# Patient Record
Sex: FEMALE | Race: WHITE
[De-identification: names, ages, dates, MRNs, and addresses within clinical notes are randomized per-mention and may not be internally consistent; named-entity substitution may affect disease eponyms.]

---

## 2017-06-27 ENCOUNTER — HOSPITAL ENCOUNTER (EMERGENCY)
Dept: HOSPITAL 25 - UCEAST | Age: 57
Discharge: HOME | End: 2017-06-27
Payer: COMMERCIAL

## 2017-06-27 VITALS — SYSTOLIC BLOOD PRESSURE: 132 MMHG | DIASTOLIC BLOOD PRESSURE: 83 MMHG
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
## 2017-06-27 DIAGNOSIS — S60.562A: Primary | ICD-10-CM
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
## 2017-06-27 PROCEDURE — 99212 OFFICE O/P EST SF 10 MIN: CPT

## 2017-06-27 PROCEDURE — G0463 HOSPITAL OUTPT CLINIC VISIT: HCPCS

## 2017-06-27 NOTE — UC
Skin Complaint HPI





- HPI Summary


HPI Summary: 





The patient comes in today for:





1.   Skin condition:





Onset:  2-3 hours. 


Palliative/provocative:  Pressing or flexing to a fish makes it worse.  


Quality: Throbbing, tightness.


Region:  Dorsum of the left hand. 


Severity:  4/10


Time: Constant.


Associated symptoms:


   Event:  She was emptying a  bucket and while doing so noticed a 

spider on her hand.  She felt a bite and she shook her hand and the spider flew 

off.  


   Headache:


   Left hand: throbbing, tightness, 


   She also complains of feeling dizziness (unbalanced).


   Last Tetanus was done in the last 10 years. 





*





- History of Current Complaint


Chief Complaint: UCSkin


Time Seen by Provider: 06/27/17 17:49


Stated Complaint: SPIDER BITE,DIZZY, SWELLING AT THE SITE


Hx Obtained From: Patient, Family/Caretaker


Hx Last Menstrual Period: 30 years ago.





- Allergy/Home Medications


Allergies/Adverse Reactions: 


 Allergies











Allergy/AdvReac Type Severity Reaction Status Date / Time


 


Sulfa Drugs Allergy Severe Hives Verified 05/19/17 14:19


 


artifical chemical Allergy Unknown Hives Uncoded 05/19/17 14:19











Home Medications: 


 Home Medications





Aspirin [Aspirin 81 MG TAB] 81 mg PO DAILY 06/27/17 [History Confirmed 06/27/17]


Indomethacin CAP* [Indocin CAP*] 25 mg PO BID 06/27/17 [History Confirmed 06/27/ 17]


Montelukast Sodium TAB* [Singulair 5 mg TAB*] 5 mg PO DAILY 06/27/17 [History 

Confirmed 06/27/17]











Review of Systems


Constitutional: Negative


Skin: Rash


Eyes: Negative


ENT: Negative


Respiratory: Negative


Cardiovascular: Negative


Gastrointestinal: Negative


Genitourinary: Negative


All Other Systems Reviewed And Are Negative: Yes





PMH/Surg Hx/FS Hx/Imm Hx


Previously Healthy: No - Tremor of head, Rheumatoid arthritis, 


Cardiovascular History: Hypertension


Respiratory History: Asthma


Psychological History: Anxiety





- Surgical History


Surgical History: Yes


Surgery Procedure, Year, and Place: TOTAL RIGHT KNEE RECONSTRUCTION, ACL REPAIR

, TWO RIGHT KNEE SCOPINGS,TOTAL HYSTERECTOMY,RECTAL REPAIR,LASER EYE SURGERY.  

LAPROSCOPIC ABLASION, LYMPH GLAND BIOPSY.  ACL REPAIR RIGHT-SCAR TISSUE REMOVED 

FROM RIGTH ARM





- Family History


Known Family History: Positive: Cardiac Disease, Hypertension, Diabetes





- Social History


Occupation: Unemployed


Lives: With Family


Alcohol Use: Rare


Substance Use Type: None


Smoking Status (MU): Never Smoked Tobacco





- Immunization History


Most Recent Tetanus Shot: Within past 10 yrs.





Physical Exam


Triage Information Reviewed: Yes


Appearance: Well-Appearing, No Pain Distress, Well-Nourished


Vital Signs: 


 Initial Vital Signs











Temp  98.0 F   06/27/17 16:54


 


Pulse  79   06/27/17 16:54


 


Resp  18   06/27/17 16:54


 


BP  132/83   06/27/17 16:54


 


Pulse Ox  100   06/27/17 16:54











Vital Signs Reviewed: Yes


Eyes: Positive: Conjunctiva Clear.  Negative: Discharge


ENT: Positive: Hearing grossly normal.  Negative: Pharyngeal erythema, Nasal 

congestion, Nasal drainage, TM bulging, TM dull, TM red, Tonsillar swelling, 

Tonsillar exudate


Dental: Negative: Gross Decay/Caries @, Dental Fracture @


Neck: Positive: Supple, Nontender, No Lymphadenopathy.  Negative: Nuchal 

Rigidity


Respiratory: Positive: Lungs clear, No respiratory distress, No accessory 

muscle use.  Negative: Crackles, Wheezing


Cardiovascular: Positive: RRR, No Murmur


Abdomen Description: Positive: Nontender, No Organomegaly, Soft.  Negative: 

Distended, Guarding, Peritoneal Signs


Musculoskeletal: Positive: Strength Intact, ROM Intact, Edema @, Other: - Left 

hand:  There was an ecchymotic area of the dorsum of the left hand with central 

induration.  There was tenderness present.  There was mild edema.  He had full 

range of motion of her left hand/fingers.


Neurological: Positive: Alert, Muscle Tone Normal


Psychological: Positive: Age Appropriate Behavior, Consolable


Skin: Negative: rashes, breakdown





Course/Dx





- Course


Course Of Treatment: Patient told about spider bite care and also her  

was told of this.





- Differential Diagnoses - Skin Complaint


Differential Diagnoses: Impetigo, Tinea





- Diagnoses


Provider Diagnoses: Insect bite.





Discharge





- Discharge Plan


Condition: Stable


Disposition: HOME


Patient Education Materials:  Insect Bite or Sting (ED)


Referrals: 


Mali Antonio MD [Primary Care Provider] - 2 Days (Please see your primary 

care provider in a couple days to see how well you are doing.  If you get worse

, please be seen sooner. )


Additional Instructions: 


Please see your primary care provider in 2-3 days to see how well you are 

doing.  If you get worse (increasing redness, soreness, swelling or discharge) 

please be seen sooner.  Elevated and apply cold compresses (20 minutes on, and 

20 minutes off) several times a day for the next 48 hours.).

## 2018-03-02 NOTE — HP
HISTORY AND PHYSICAL:

 

DATE OF OFFICE VISIT:  03/02/18

 

DATE OF SURGERY:  03/15/18

 

SURGEON:  Laure Jeter MD* (dictated by ANDRE Xiong).

 

PROCEDURE:  Right total knee arthroplasty.

 

CHIEF COMPLAINT:  Right knee pain.

 

HISTORY OF PRESENT ILLNESS:  Ms. Fairchild is a 57-year-old female with complaints 
of right knee pain.  She has failed conservative management and elected to 
proceed with a right total knee arthroplasty, which is scheduled for 03/15/18 
with Dr. Jeter.

 

PAST MEDICAL HISTORY:  Hypertension, anxiety, cervical dystonia, chronic pain 
syndrome, ankylosing spondylitis, hyperlipidemia and asthma.

 

PAST SURGICAL HISTORY:  Tubal ligation, hysterectomy, vein stripping, right 
knee arthroscopy x5, axillary lymphadenectomy, laser eye surgery and roddy-
neuropathy.

 

CURRENT MEDICATIONS:

1.  Montelukast sodium 10 mg daily.

2.  EpiPen as needed.

3.  _____ mg twice daily.

4.  Trazodone 50 mg q.h.s.

5.  Propranolol 20 mg twice a day.

6.  Clonazepam 0.5 mg as needed.

7.  Dymista, allergy.

8.  Allegra, allergy.

9.  Dulera.

10.  Pazeo.

11.  Ventolin HFA.

12.  Oxybutynin chloride 5 mg daily.

13.  Hair Skin and Nails every day.

14.  Calcium with vitamin D daily.

15.  Vitamin B and fish oil.

 

ALLERGIES:  BIAXIN, _____ environmental and CODEINE causing nausea.

 

FAMILY HISTORY:  Family history of diabetes, aortic aneurysm, coronary artery 
disease, stroke, Castleman's disease and schizophrenia.

 

SOCIAL HISTORY:  She is a 57-year-old female.  She lives with her .  She 
is a retired teacher at [employer].  She does not smoke, use drugs 
or alcohol.

 

REVIEW OF SYSTEMS:  A complete 14 point review of systems was reviewed with the 
patient, is positive for asthma.  She denies history of DVT, PE, hepatitis C or 
HIV.

 

PHYSICAL EXAM: GENERAL:  She is well developed, well nourished, in no acute 
distress.

 

VITAL SIGNS:  She stands 5 feet 3 inches tall, weighs 135 pounds, her blood 
pressure is 126/86, heart rate is 80.

 

HEENT:  She is normocephalic, atraumatic.

 

NECK:  Supple.  No palpable lymph nodes.

 

PULMONARY:  Lungs are clear to auscultation bilaterally.

 

CARDIO:  Regular rate and rhythm.  Strong S1 and S2.

 

ABDOMEN:  Soft, nontender, nondistended.

 

NEUROLOGIC:  She is alert and oriented x3.  Cranial nerves II through XII are 
intact.

 

MUSCULOSKELETAL:  Right lower extremity, skin is intact.  There are no open 
wounds or abrasions.  She has some tenderness over the medial and lateral joint 
line.  5 to 130 degrees of motion with patellofemoral crepitus.  There is a 
moderate effusion.  2+ dorsalis pedis pulses.  She has intact sensation in her 
lower extremities.  Muscle group strengths are intact at 5/5.

 

ASSESSMENT AND PLAN:  Ms. Fairchild is a 57-year-old female with end-stage 
osteoarthritis of right knee.  She has failed conservative management and 
elected to proceed with a right total knee arthroplasty, which is scheduled for 
03/15/18 with Dr. Jeter.  Dr. Jeter discussed the risks and benefits of the 
surgery on today's visit.  All of her questions are answered.  She will follow 
with Dr. Jeter in 2 weeks after the surgery.

 

 ____________________________________ 

ANDRE IXONG

 

[id number]/CPS #: [id number]

DANE

## 2018-03-15 ENCOUNTER — HOSPITAL ENCOUNTER (INPATIENT)
Dept: HOSPITAL 25 - AA | Age: 58
LOS: 4 days | Discharge: HOME HEALTH | DRG: 302 | End: 2018-03-19
Attending: ORTHOPAEDIC SURGERY | Admitting: ORTHOPAEDIC SURGERY
Payer: COMMERCIAL

## 2018-03-15 DIAGNOSIS — Z82.3: ICD-10-CM

## 2018-03-15 DIAGNOSIS — M45.9: ICD-10-CM

## 2018-03-15 DIAGNOSIS — Z81.8: ICD-10-CM

## 2018-03-15 DIAGNOSIS — J45.909: ICD-10-CM

## 2018-03-15 DIAGNOSIS — Z79.82: ICD-10-CM

## 2018-03-15 DIAGNOSIS — Z88.5: ICD-10-CM

## 2018-03-15 DIAGNOSIS — Z98.51: ICD-10-CM

## 2018-03-15 DIAGNOSIS — Z88.8: ICD-10-CM

## 2018-03-15 DIAGNOSIS — I95.1: ICD-10-CM

## 2018-03-15 DIAGNOSIS — R53.1: ICD-10-CM

## 2018-03-15 DIAGNOSIS — Z82.49: ICD-10-CM

## 2018-03-15 DIAGNOSIS — M21.061: ICD-10-CM
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
## 2018-03-15 DIAGNOSIS — Z79.01: ICD-10-CM

## 2018-03-15 DIAGNOSIS — I10: ICD-10-CM

## 2018-03-15 DIAGNOSIS — D62: ICD-10-CM

## 2018-03-15 DIAGNOSIS — R11.0: ICD-10-CM

## 2018-03-15 DIAGNOSIS — M25.761: ICD-10-CM

## 2018-03-15 DIAGNOSIS — Z90.710: ICD-10-CM

## 2018-03-15 DIAGNOSIS — F41.9: ICD-10-CM

## 2018-03-15 DIAGNOSIS — M25.461: ICD-10-CM

## 2018-03-15 DIAGNOSIS — T14.90XS: ICD-10-CM

## 2018-03-15 DIAGNOSIS — R09.89: ICD-10-CM

## 2018-03-15 DIAGNOSIS — R42: ICD-10-CM

## 2018-03-15 DIAGNOSIS — Z83.3: ICD-10-CM

## 2018-03-15 DIAGNOSIS — R13.10: ICD-10-CM

## 2018-03-15 DIAGNOSIS — M17.31: Primary | ICD-10-CM

## 2018-03-15 DIAGNOSIS — E78.5: ICD-10-CM

## 2018-03-15 DIAGNOSIS — G89.4: ICD-10-CM

## 2018-03-15 DIAGNOSIS — R19.7: ICD-10-CM

## 2018-03-15 PROCEDURE — 87070 CULTURE OTHR SPECIMN AEROBIC: CPT

## 2018-03-15 PROCEDURE — 87899 AGENT NOS ASSAY W/OPTIC: CPT

## 2018-03-15 PROCEDURE — 85049 AUTOMATED PLATELET COUNT: CPT

## 2018-03-15 PROCEDURE — 85018 HEMOGLOBIN: CPT

## 2018-03-15 PROCEDURE — C1776 JOINT DEVICE (IMPLANTABLE): HCPCS

## 2018-03-15 PROCEDURE — 88312 SPECIAL STAINS GROUP 1: CPT

## 2018-03-15 PROCEDURE — 88311 DECALCIFY TISSUE: CPT

## 2018-03-15 PROCEDURE — 87045 FECES CULTURE AEROBIC BACT: CPT

## 2018-03-15 PROCEDURE — 86900 BLOOD TYPING SEROLOGIC ABO: CPT

## 2018-03-15 PROCEDURE — 0SRC069 REPLACEMENT OF RIGHT KNEE JOINT WITH OXIDIZED ZIRCONIUM ON POLYETHYLENE SYNTHETIC SUBSTITUTE, CEMENTED, OPEN APPROACH: ICD-10-PCS | Performed by: ORTHOPAEDIC SURGERY

## 2018-03-15 PROCEDURE — 87046 STOOL CULTR AEROBIC BACT EA: CPT

## 2018-03-15 PROCEDURE — 85014 HEMATOCRIT: CPT

## 2018-03-15 PROCEDURE — 99156 MOD SED OTH PHYS/QHP 5/>YRS: CPT

## 2018-03-15 PROCEDURE — 0DB58ZX EXCISION OF ESOPHAGUS, VIA NATURAL OR ARTIFICIAL OPENING ENDOSCOPIC, DIAGNOSTIC: ICD-10-PCS | Performed by: INTERNAL MEDICINE

## 2018-03-15 PROCEDURE — 86901 BLOOD TYPING SEROLOGIC RH(D): CPT

## 2018-03-15 PROCEDURE — 94640 AIRWAY INHALATION TREATMENT: CPT

## 2018-03-15 PROCEDURE — 87073 CULTURE BACTERIA ANAEROBIC: CPT

## 2018-03-15 PROCEDURE — 80048 BASIC METABOLIC PNL TOTAL CA: CPT

## 2018-03-15 PROCEDURE — 87493 C DIFF AMPLIFIED PROBE: CPT

## 2018-03-15 PROCEDURE — 88305 TISSUE EXAM BY PATHOLOGIST: CPT

## 2018-03-15 PROCEDURE — 87205 SMEAR GRAM STAIN: CPT

## 2018-03-15 PROCEDURE — 99157 MOD SED OTHER PHYS/QHP EA: CPT

## 2018-03-15 PROCEDURE — 94760 N-INVAS EAR/PLS OXIMETRY 1: CPT

## 2018-03-15 PROCEDURE — 85610 PROTHROMBIN TIME: CPT

## 2018-03-15 PROCEDURE — 86850 RBC ANTIBODY SCREEN: CPT

## 2018-03-15 PROCEDURE — 36415 COLL VENOUS BLD VENIPUNCTURE: CPT

## 2018-03-15 PROCEDURE — 0DB98ZX EXCISION OF DUODENUM, VIA NATURAL OR ARTIFICIAL OPENING ENDOSCOPIC, DIAGNOSTIC: ICD-10-PCS | Performed by: INTERNAL MEDICINE

## 2018-03-15 PROCEDURE — 87077 CULTURE AEROBIC IDENTIFY: CPT

## 2018-03-15 RX ADMIN — PROPRANOLOL HYDROCHLORIDE SCH: 20 TABLET ORAL at 21:57

## 2018-03-15 RX ADMIN — CLONAZEPAM SCH MG: 0.5 TABLET ORAL at 22:00

## 2018-03-15 RX ADMIN — CLONAZEPAM SCH MG: 0.5 TABLET ORAL at 17:00

## 2018-03-15 RX ADMIN — VENLAFAXINE HYDROCHLORIDE SCH MG: 75 CAPSULE, EXTENDED RELEASE ORAL at 22:00

## 2018-03-15 RX ADMIN — CEFAZOLIN SODIUM SCH MLS/HR: 1 SOLUTION INTRAVENOUS at 17:33

## 2018-03-15 RX ADMIN — MORPHINE SULFATE PRN MG: 2 INJECTION, SOLUTION INTRAMUSCULAR; INTRAVENOUS at 19:30

## 2018-03-15 RX ADMIN — OXYCODONE HYDROCHLORIDE AND ACETAMINOPHEN PRN TAB: 5; 325 TABLET ORAL at 15:26

## 2018-03-15 RX ADMIN — OXYCODONE HYDROCHLORIDE PRN MG: 5 CAPSULE ORAL at 17:33

## 2018-03-15 RX ADMIN — DOCUSATE SODIUM SCH MG: 100 CAPSULE, LIQUID FILLED ORAL at 21:59

## 2018-03-15 RX ADMIN — MORPHINE SULFATE PRN MG: 2 INJECTION, SOLUTION INTRAMUSCULAR; INTRAVENOUS at 17:08

## 2018-03-15 RX ADMIN — OXYBUTYNIN CHLORIDE SCH: 5 TABLET ORAL at 21:57

## 2018-03-15 RX ADMIN — MAGNESIUM HYDROXIDE SCH: 400 SUSPENSION ORAL at 21:57

## 2018-03-15 RX ADMIN — TRAZODONE HYDROCHLORIDE SCH MG: 50 TABLET ORAL at 22:00

## 2018-03-15 RX ADMIN — OXYCODONE HYDROCHLORIDE AND ACETAMINOPHEN PRN TAB: 5; 325 TABLET ORAL at 22:00

## 2018-03-15 NOTE — PN
Hospitalist Progress Note


Date of Service: 03/15/18





Received a call from [facility] that Pt has a piece of chicken stuck in her esophagus. 

She is able to talk and is in no respiratory distress. Pt is unable to swallow 

her saliva. Called Dr. Richmond and he will be in to do an emergent endo to 

remove the chicken. Pt states that this has happened once prior and she 

received medication at urgent care and was then able to swallow the food that 

was stuck.





~ 1820, Pt received IV Glucogon.





~ 1920. Pt still feels like the chicken is stuck, with effort she is now able 

to swallow saliva.

## 2018-03-15 NOTE — CONS
CONSULTATION REPORT:

 

DATE OF CONSULTATION:  03/15/18.

 

REQUESTING PHYSICIAN:  Lelo Smith NP

 

INDICATION:  Dysphagia.

 

NARRATIVE:  Ms. Fairchild is a 57-year-old female who underwent a right total knee 
arthroplasty today.  She has a history of dysphagia.  The dysphagia is to 
solids usually.  She had one episode where she had to go to [facility].  
It appears that they gave her Glucagon and the foreign body sensation resolved.
  The patient was eating her hospital chicken tonight and it felt like a piece 
of the hospital chicken became stuck in her esophagus.  She tried to swallow 
saliva, but was unable to.  The hospitalist PA did give her some Glucagon and 
by the time I came into see her, she was able to tolerate some liquids; however
, she still states it feels like there is something obstructing her esophagus.  
She can swallow her saliva.  She does have a history of asthma and allergies.  
She states that this has happened to her numerous times in the past; however, 
it has always passed on it own.  She has mild upper chest discomfort below her 
sternum.  She denies any difficulty breathing at this time.

 

PAST MEDICAL HISTORY:  Hypertension, cervical dystonia, anxiety, chronic pain, 
ankylosing spondylitis, hyperlipidemia and asthma.

 

PAST SURGICAL HISTORY:  Status post right knee arthroplasty today, tubal 
ligation, hysterectomy, right knee arthroscopy x5, laser eye surgery.

 

MEDICATIONS:  Upon admission include:

 

1.  Oxybutynin.

2.  Ventolin.

3.  Dulera.

4.  Allegra.

5.  Dymista.

6.  Clonazepam.

7.  Propranolol.

8.  Trazodone.

9.  EpiPen.

10.  Montelukast.

 

ALLERGIES:  BIAXIN.

 

FAMILY HISTORY:  Significant for coronary artery disease, stroke, Castleman's 
syndrome, schizophrenia, diabetes, aortic aneurysm.

 

SOCIAL HISTORY:  She denies any tobacco or IV drug use.  No alcohol.

 

REVIEW OF SYSTEMS:  12 systems were reviewed, other than that mentioned in the 
HPI were unremarkable.

 

PHYSICAL EXAM:  Vital Signs:  Temperature is 98.2, her blood pressure is 103/68
, pulse is 96, respiratory rate of 18, O2 sat is 97% on 2 L.  General:  Well- 
appearing female, lying flat in bed.  Alert, oriented, pleasant, fluent.  HEENT
: Mucous membranes are moist without lesions, ulcers or exudate.  Neck is 
supple. Trachea is midline.  Head is normocephalic, atraumatic.  Lymphs:  No 
supraclavicular, cervical lymphadenopathy.  Heart:  Regular rate and rhythm.  
No murmurs, rubs or gallops.  Lungs:  Expiratory wheeze.  Okay air movement.  
Abdomen: Positive bowel sounds, soft, nontender and nondistended.  No 
hepatosplenomegaly, masses, rebound or guarding.  Skin:  Warm and dry.  Right 
knee has dressing applied to it.  It is clean, dry and intact.

 

LABORATORY DATA:  None.

 

She also currently is on warfarin.

 

ASSESSMENT AND PLAN:  A 57-year-old female with long history of dysphagia to 
solids, who developed a sensation of foreign body obstruction after eating her 
hospital chicken tonight.  Currently, she can swallow some liquids.  I am not 
sure if the foreign body persists or not.  I have my doubt; however, the 
patient feels like it is still lodged in there.  Given this fact, I think we 
should perform an upper endoscopy tonight.  I do wonder if she could have 
eosinophilic esophagitis. I can take biopsies for this eosinophilic esophagitis
; however, if the patient has stricture or ring given her heparin, I cannot do 
anything about it at that time.

 

 [id number]/CPS #: [id number]

MTDD

## 2018-03-15 NOTE — XMS REPORT
Alyssa Fairchild

 Created on:February 15, 2018



Patient:Alyssa Fairchild

Sex:Female

:1960

External Reference #:[id number]





Demographics







 Address  [address] [place] [address]

 

 Home Phone  [phone]

 

 Mobile Phone  [phone]

 

 Work Phone  [phone]

 

 Email Address  [email or web address]

 

 Preferred Language  English

 

 Marital Status  Not  Or 

 

 Muslim Affiliation  Unknown

 

 Race  White

 

 Ethnic Group  Not  Or 









Author







 Organization  [facility]

 

 Address  [address] [place] [address]

 

 Phone  [phone]









Support







 Name  Relationship  Address  Phone

 

 FRANCISCO Fairchild II    Unavailable  [phone]

 

 Soraida Perez  Sibling  Unavailable  [phone]









Care Team Providers







 Name  Role  Phone

 

 Mali Antonio MD  Primary Care Physician  Unavailable









Payers







 Type  Date  Identification Numbers  Payment Provider  Subscriber

 

 Commercial  Effective:  Policy Number: [id number]  BC/BS Of RAY  Alyssa Fairchild



   2013      









 PayID: [id number]  [address] [id number]









 [place] [address]







Problems







 Date  Description  Provider  Status

 

 Onset: 2017  Chronic allergic conjunctivitis  Dorina Ackerman M.D.  Active

 

 Onset: 2017  Uncomplicated moderate persistent asthma  Dorina Ackerman M.D.  Active

 

 Onset: 10/12/2016  Moderate persistent asthma with (acute)  Dorina Ackerman M.D.  Active



   exacerbation    

 

 Onset: 2016  Uncomplicated moderate persistent asthma  Dorina Ackerman M.D.  Active

 

 Onset: 2015  Body Mass Index 25.0-25.9 Adult  Dorina Ackerman M.D.  Active

 

 Onset: 2015  Allergic rhinitis  Dorina Ackerman M.D.  Active

 

 Onset: 2015  Extrinsic asthma without status  Dorina Ackerman M.D.  Active



   asthmaticus    

 

 Onset: 2015  Allergic rhinitis due to pollen  Dorina Ackerman M.D.  Active







Family History







 Date  Family Member(s)  Problem(s)  Comments

 

   General  Seasonal Allergies  

 

   General  Diabetes  

 

   General  Heart Disease  

 

   General  Hypertension  

 

   General  sinus disorders  

 

   General  Stroke  TIA"s

 

   Father  Seasonal Allergies  

 

   Father  Heart Disease  

 

   Father  sinus disorders  

 

   Father   due to Autoimmune, Heart  ()



     Complications  

 

   Mother  Stroke  

 

 Onset: ()  Mother  Skin Disease/ rash  Skin Cancer

 

   Mother  Seasonal Allergies  

 

   Mother  Diabetes  

 

   Mother  Migraine  

 

   Mother  sinus disorders  







Social History







 Type  Date  Description  Comments

 

 Education    Highest Level Completed,  



     Master's Degree  

 

 Marital Status    Legal Status:   

 

 Lives With    Spouse  

 

 Home Environment    [employer] Mattress Cover  

 

 Home Environment    Lives in a 1 level wood frame  



     home  

 

 Home Environment    Does not use air   

 

 Home Environment    Has a window air conditioner  

 

 Home Environment    Stairs are present  

 

 Home Environment    The basement is dry  

 

 Home Environment    Finished Basement  

 

 Home Environment    Unfinished Basement  

 

 Home Environment    Uses a dehumidifier  

 

 Home Environment    [employer] Comforter  

 

 Home Environment    Mattress is 10 years old  

 

 Home Environment    Mattress is encased in an  



     allergy proof case  

 

 Home Environment    Rubber Mattress  

 

 Home Environment    There are draperies in the  



     home  

 

 Home Environment    The home is not aba  

 

 Home Environment    The floors are tile  

 

 Home Environment    The floors are wood  

 

 Home Environment    Uses baseboard heating  

 

 Home Environment    [place]  



     [place]  

 

 Home Environment    Water Source: [place]  

 

 Home Environment    House built in early   

 

 Smoke-Free    Home is smoke-free  

 

 Smoke-Free    Work is smoke-free  

 

 Pets    1 dog  

 

 Pets    Animals sleep in bedroom  

 

 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



       been inside

 

 Work Status    Full-Time Employment  

 

 Work Environment    School  

 

 Hobbies    Gardening  

 

 Hobbies    Hiking  

 

 Hobbies    Reading  

 

 Hobbies    Golfing  

 

 ETOH Use    Denies alcohol use  

 

 Smoking    Patient has never smoked  

 

 Recreational Drug Use    Denies Drug Use  

 

 Parental Marital Status    Parents   

 

 Parental Involvement    Mother and father are very  



     involved  







Allergies, Adverse Reactions, Alerts







 Date  Description  Reaction  Status  Severity  Comments

 

 2015  Ceclor  Urticaria  active    







Medications







 Medication  Date  Status  Form  Strength  Qnty  SIG  Indications  Ordering



                 Provider

 

 Desloratadine  /  Active  Tablets  5mg  30tab  Dissolve  J30.2  Melanie



       Dispers    s  1 Tablet    Dussing,



             In Mouth    FNP-C



             Daily    

 

 Epipen 2-Ranjeet  /  Active  Solution  0.3mg/0.3M  2unit  use as    2016    Auto-Inject  L  s  directed    VIRGILIO Dove

 

 Dulera  /  Active  Aerosol  200-5mcg/A  13uni  Inhale 2016      ct  ts  Puffs By    Stoney Ackerman In    M.D.



             The    



             Morning,    



             And In    



             The    



             Evening    

 

 Pazeo  /  Active  Solution  0.7%  2.5un  instill 1  J45.40  2015        its  drop into    Almanza-Jam



             both eyes    lyle,



             once    FNP-C



             daily as    



             needed    

 

 Montelukast  /  Active  Tablets  10mg  30tab  Take One  L50.9  Dorina



 Sodium  2015        s  Tablet By    McMaria Victoriarmin,



             Mouth    M.D.



             Nightly    



             At    



             Bedtime    

 

 Clonazepam  /  Active  Tablets  0.5mg    take     Unknown



   0000          tablet by    



             mouth    



             twice a    



             day then    



             1 every    



             evening    



             Max 2/Day    

 

 Effexor XR  /  Active  Caps ER 24HR  150mg    bid    Unknown



   0000              

 

 Trazodone HCL  /  Active  Tablets  50mg    bedtime    Unknown



   0000              

 

 Toprol XL  /  Active  Tablets ER  20mg    bid    Unknown



   0000    24HR          

 

 Ventolin HFA  /  Active  Aerosol  108(90Base  1unit  2 every 4    Lita      ) mcg/Act  s  hours as    Almanza-Jam



             needed    lyle,



                 FNP-C

 

 Dymista  /  Active  Suspension  137-50mcg/  23uni  use 1          Act  ts  spray in    Almanza-Jam



             each    lyle,



             nostril    FNP-C



             twice    



             daily    

 

 Calcium/Mag/Zin  /  Active        one tab    Unknown



 c/D3  0000          daily    

 

 Stress B  /  Active  Tablets      one tab    Unknown



 Complex  0000          daily    

 

 Ramila-C  /  Active        one daily    Unknown



   0000              

 

 L-Lysine  /  Active  Tablets  1000mg    1-2 tabs    Unknown



   0000          daily    

 

 Hair/Skin/Nails  00/  Active  Tablets      one tab    Unknown



 /Biotin  0000          daily    

 

 Aspirin  /  Active  Chewtabs  81mg    one daily    Unknown



   0000              

 

 Oxybutynin  /  Active  Tablets  5mg    one tab    Unknown



 Chloride  0000          daily    

 

 Botox  /  Active  Solution Rec      every    Unknown



   0000          three    



             months    

 

 Stool Softener  /  Active  Capsules  250mg    prn    Unknown



   0000              







Medications Administered in Office







 Medication  Date  Status  Form  Strength  Qnty  SIG  Indications  Ordering



                 Provider

 

 Celestone/Santi    Administered  Injection          Dorina lloydne  015              Tyron,



 [id number] 1                M.D.



 cc                

 

 Injection    Administered  Injection          Elliot 007 Rubinstein, M.D.







Immunizations







 CPT Code  Status  Date  Vaccine  Lot #

 

 19935  Given  Unknown  Influenza Vaccine  

 

 28967  Given  Unknown  Influenza Vaccine  

 

 81383  Given  Unknown  Influenza Vaccine  

 

 56739  Given  Unknown  Influenza Vaccine  

 

 10165  Given  Unknown  Influenza Vaccine  







Vital Signs







 Date  Vital  Result  Comment

 

 2018  Height  64 inches  5'4"









 Weight  135.00 lb  verbalized

 

 Weight in kg's  61.236  

 

 Respiratory Rate  18 /min  

 

 Heart Rate  94 /min  

 

 O2 % BldC Oximetry  97 %  

 

 BP Systolic  120 mmHg  

 

 BP Diastolic  86 mmHg  

 

 Asthma Control Test  22  

 

 BMI (Body Mass Index)  23.2 kg/m2  









 2017  Height  64 inches  5'4"









 Weight  136.00 lb  

 

 Weight in kg's  61.690  

 

 Respiratory Rate  20 /min  

 

 Heart Rate  84 /min  

 

 Body Temperature  99.3 F  

 

 O2 % BldC Oximetry  97 %  

 

 BP Systolic  137 mmHg  

 

 BP Diastolic  91 mmHg  

 

 Asthma Control Test  14  

 

 BMI (Body Mass Index)  23.3 kg/m2  









 2017  Height  64 inches  5'4"









 Weight  136.00 lb  

 

 Weight in kg's  61.690  

 

 Respiratory Rate  16 /min  

 

 Heart Rate  62 /min  

 

 O2 % BldC Oximetry  97 %  

 

 BP Systolic  129 mmHg  

 

 BP Diastolic  95 mmHg  

 

 Asthma Control Test  22  

 

 BMI (Body Mass Index)  23.3 kg/m2  









 2017  Height  64 inches  5'4"









 Weight  138.00 lb  

 

 Weight in kg's  62.597  

 

 Respiratory Rate  20 /min  

 

 Heart Rate  83 /min  

 

 O2 % BldC Oximetry  96 %  

 

 BP Systolic  137 mmHg  

 

 BP Diastolic  94 mmHg  

 

 Asthma Control Test  20  

 

 BMI (Body Mass Index)  23.7 kg/m2  









 10/12/2016  Height  64 inches  5'4"









 Weight  139.00 lb  

 

 Weight in kg's  63.050  

 

 Respiratory Rate  18 /min  

 

 Heart Rate  89 /min  

 

 O2 % BldC Oximetry  95 %  

 

 BP Systolic  135 mmHg  

 

 BP Diastolic  86 mmHg  

 

 Asthma Control Test  20  

 

 BMI (Body Mass Index)  23.9 kg/m2  









 2016  Height  64 inches  5'4"









 Weight  143.00 lb  

 

 Weight in kg's  64.865  

 

 Respiratory Rate  18 /min  

 

 Heart Rate  86 /min  

 

 O2 % BldC Oximetry  96 %  

 

 BP Systolic  134 mmHg  

 

 BP Diastolic  87 mmHg  

 

 Asthma Control Test  22  

 

 BMI (Body Mass Index)  24.5 kg/m2  









 2015  Height  64 inches  5'4"









 Weight  138.00 lb  pt stated

 

 Weight in kg's  62.597  

 

 Respiratory Rate  20 /min  

 

 Heart Rate  103 /min  

 

 O2 % BldC Oximetry  96 %  

 

 BP Systolic  126 mmHg  

 

 BP Diastolic  96 mmHg  

 

 Asthma Control Test  23  

 

 BMI (Body Mass Index)  23.7 kg/m2  









 2015  Height  64 inches  5'4"









 Weight  146.00 lb  

 

 Weight in kg's  66.226  

 

 Respiratory Rate  18 /min  

 

 Heart Rate  83 /min  

 

 O2 % BldC Oximetry  98 %  

 

 BP Systolic  118 mmHg  

 

 BP Diastolic  70 mmHg  

 

 Asthma Control Test  22  

 

 BMI (Body Mass Index)  25.1 kg/m2  









 2015  Height  64 inches  5'4"









 Weight  146.00 lb  

 

 Weight in kg's  66.226  

 

 Respiratory Rate  16 /min  

 

 Heart Rate  77 /min  

 

 O2 % BldC Oximetry  97 %  

 

 BP Systolic  126 mmHg  

 

 BP Diastolic  80 mmHg  

 

 BMI (Body Mass Index)  25.1 kg/m2  









 2015  Height  64 inches  5'4"









 Weight  146.00 lb  

 

 Weight in kg's  66.226  

 

 Respiratory Rate  18 /min  

 

 Heart Rate  74 /min  

 

 O2 % BldC Oximetry  98 %  

 

 BP Systolic  118 mmHg  

 

 BP Diastolic  76 mmHg  

 

 BMI (Body Mass Index)  25.1 kg/m2  







Results







 Test  Date  Test  Result  H/L  Range  Note

 

 Xray  10/20/2016  Chest Xray, 2 views  &lt;pending&gt;      







Procedures







 Date  CPT Code  Description  Status

 

 2018  30098  Pre PFT  Completed

 

 2017  29555  Ippb  Completed

 

 2017  27536  Ippb  Completed

 

 2017  35626  Pre PFT  Completed

 

 2017  22387  Pre PFT  Completed

 

 10/12/2016  98711  Ippb  Completed

 

 10/12/2016  49432  Pre PFT  Completed

 

 2016  87411  Pre PFT  Completed

 

 2015  96948  Pre PFT  Completed

 

 2015  05391  Pre PFT  Completed

 

 2015  77678  Pulmonary Function Test  Completed

 

 10/28/2011  19089  Pulmonary Function Test  Completed

 

 2007  35998  Injection  Completed

 

 2007  68535  Extract 1-10  Completed

 

 2007  72844  Skin Test Scratch # Of Units ____  Completed







Encounters







 Type  Date  Location  Provider  CPT E/M  Dx

 

 Office Visit  2017  2:20p  VIRGILOI Ramos  14048  J06.9









 J30.2

 

 J30.89

 

 J45.40









 Office Visit  2017  3:20p  Jorge Ackerman M.D.  16761  J30.2









 J30.89

 

 J45.40

 

 H10.45









 Office Visit  2017  3:40p  Jorge Ackerman M.D.  69671  J30.2









 J30.89

 

 J45.40

 

 H10.45

 

 Z68.23









 Office Visit  10/12/2016  3:20p  Jorge Ackerman M.D.  33277  J45.41









 J30.2

 

 J30.89

 

 Z23

 

 Z68.23









 Office Visit  2016  3:40p  Jorge Ackerman M.D.  10540  J45.40









 J30.2

 

 J30.89

 

 Z68.24









 Office Visit  2015 11:00a  Jorge Ackerman M.D.  76422  493.00









 493.00

 

 477.8

 

 477.8

 

 V85.1

 

 V85.1









 Office Visit  2015  3:40p  Jorge Deleon, PH.D, [facility]  
01232  493.00









 477.8

 

 477.0

 

 708.9

 

 V85.21









 Office Visit  2015  1:20p  Jorge Deleon, PH.D, [facility]  
54083  708.9









 477.0

 

 477.8

 

 493.00

 

 V85.21









 Office Visit  2015  2:20p  Jorge Ackerman M.D.  11820  477.0









 493.00

 

 477.8

 

 V85.21









 Office Visit  2010  4:20p  Jorge Gates M.D.  32695  
477.0









 477.8









 Office Visit  2007  4:00p  [place] Elliot Rubinstein, M.D.  57445  477.0









 477.8









 Office Visit  2007  3:45p  Jorge Gates M.D.  59381  
477.0









 477.8









 Office Visit  2007 10:00a  Jorge Gates M.D.  05850  
477.0









 477.8









 Office Visit  2007  2:45p  [place] Elliot Rubinstein, M.D.  27996  477.0









 477.8









 Office Visit  2005  3:15p  Jorge Gates M.D.  74708  
477.0









 477.8







Plan of Care

2018 - Dorina Ackerman M.D.J45.40 Moderate persistent asthma, 
bsputadwxznsqB99.89 Other allergic teprahfeF36.2 Other seasonal allergic 
esxwqjelL92.45 Other chronic allergic conjunctivitisFollow up:2018, but 
call next week to report how youRecommendations:Refrain from wearing perfumes/
scented colognes while visiting our office.  Continue to monitor your symptoms 
If you continue to have more sinus and chest symptoms/congestion, you may need 
an antibiotics call the office to check in  Increase the Dulera 200/5 2 puffs 
twice daily through your surgery Continue all other medications including the 
eye drops, montelukast, Dymista, and desloratdine

## 2018-03-15 NOTE — CONS
CC:  Dr. Mali Antonio; Dr. Laure Jeter*

 

CONSULTATION REPORT:

 

DATE OF CONSULT:  03/15/18

 

PRIMARY CARE PROVIDER:  Dr. Mali Antonio.

 

ATTENDING PHYSICIAN:  Dr. Delia Pryor (dictated by Lelo Berry NP).

 

REASON FOR CONSULT:  Co-medical management in a patient with a history of 
hypertension, anxiety, and chronic pain syndrome.

 

HISTORY OF PRESENT ILLNESS:  Ms. Fairchild is a 57-year-old female with past medical 
history significant for hypertension, anxiety, cervical dystonia, chronic pain 
syndrome, ankylosing spondylitis, hyperlipidemia, and asthma, who presented to 
the hospital today to undergo an elective right total knee arthroplasty with 
Dr. Jeter. Leading up to the procedure today, the patient states that she has 
been in her usual state of health and has been feeling well.  She denies any 
fever, chills, chest pain, shortness of breath, nausea, vomiting, or diarrhea.  
Postoperatively, the patient is doing well.  She states that her pain is 
controlled.  Hospitalists were asked to assist with the co-medical management 
of this patient during her hospitalization.

 

PAST MEDICAL HISTORY:

1.  Hypertension.

2.  Anxiety.

3.  Cervical dystonia.

4.  Chronic pain syndrome.

5.  Ankylosing spondylitis.

6.  Hyperlipidemia.

7.  Asthma.

 

PAST SURGICAL HISTORY:

1.  Status post tubal ligation.

2.  Status post hysterectomy and bilateral salpingo-oophorectomy.

3.  Status post saphenous vein stripping.

4.  Status post 5 right knee arthroscopies.

5.  Status post ACL repair.

6.  Status post left axillary lymphadenectomy.

7.  Status post laser eye surgery. 



HOME MEDICATIONS:

1.  Singulair 10 mg oral daily.

2.  EpiPen 0.3 mg subcu as needed for anaphylaxis.

3.  Trazodone 50 mg oral at bedtime as needed for sleep.

4.  Propranolol 20 mg oral twice daily.

5.  Clonazepam 0.25 mg twice daily and 0.5 mg at bedtime.

6.  Dymista 137/50 mcg 2 sprays to both nares twice daily.

7.  Allegra Allergy 1 tablet oral daily.

8.  Dulera 200/5 one puff inhalation twice daily.

9.  Pazeo 0.7% one drop to both eyes daily.

10.  Ventolin HFA 1 to 2 puffs inhalation twice daily as needed for shortness 
of breath.

11.  Oxybutynin 5 mg oral daily.

12.  Hair, Skin, and Nails vitamin 5000 mcg oral daily.

13.  Calcium with vitamin D 500/200 one tablet oral twice daily.

14.  Calcium/bioflavonoids 1 tablet oral daily.

15.  Effexor 150 mg oral twice daily.

16.  B-complex 1 tablet oral daily.

17.  Fish oil 1000 mg oral daily.

18.  BuSpar 5 mg oral twice daily.

19.  Lysine 500 to 1000 mg oral every evening.

20.  Colace 200 mg oral daily as needed for constipation.

21.  Aspirin 81 mg oral daily.

 

ALLERGIES:  BIAXIN, CORN, ENVIRONMENTAL, CODEINE.

 

FAMILY HISTORY:  The patient's father had a history of heart disease, mother 
with a history of diabetes and cerebrovascular accident.

 

SOCIAL HISTORY:  The patient denies tobacco, alcohol, or recreational drug use. 
She is a retired .  Her , Hillary Fairchild, will be her 
surrogate decision maker in the event she is unable to make decisions for 
herself.

 

REVIEW OF SYSTEMS:  I performed 14-point review of systems.  All the pertinent 
positives and negatives are mentioned in the history of present illness.  The 
remaining review of systems is negative.

 

PHYSICAL EXAM:  Vital Signs:  Temperature 98.4, heart rate 113, respiratory 
rate 16, O2 sat 97% on 2 L nasal cannula, blood pressure 99/60.  Appearance:  
The patient is alert, pleasant, appears to be in no acute distress.  HEENT: 
Normocephalic, atraumatic.  Pupils are equal and reactive to light.  
Extraocular movements are intact.  Neck is supple.  Respiratory:  There is no 
accessory muscle use.  Lungs are clear to auscultation bilaterally.  
Cardiovascular:  Regular rate and rhythm.  S1, S2 present.  There are no murmurs
, rubs, or gallops heard. Abdomen:  Soft, nontender, and nondistended.  Bowel 
sounds present x4. Extremities:  No lower extremity edema.  DP and PT pulses 
are 2+ and symmetric. Musculoskeletal:  There is no clubbing or cyanosis noted.
  Skin:  There is dressing to the right knee that is clean, dry, and intact 
with a Hemovac intact draining bloody drainage.  Neurovascular:  Cranial nerves 
II through XII are grossly intact. The patient moves all extremities.  
Psychological:  The patient is calm and cooperative.

 

DIAGNOSTIC STUDIES/LAB DATA:  Preoperative labs from 02/26/18, weight blood 
cell count 7.4, hemoglobin 14.1, hematocrit 42, platelet count 177.  Sodium 136
, potassium 4.1, chloride 99, CO2 30, BUN 14, creatinine 0.72, glucose 90. 
Urinalysis negative on 03/01/18.

 

IMPRESSION:  Ms. Fairchild is a 57-year-old female with past medical history 
significant for hypertension, anxiety, cervical dystonia, chronic pain syndrome
, ankylosing spondylitis, hyperlipidemia, and asthma, who presented to the 
hospital today for an elective right total knee arthroplasty with Dr. Laure Jeter.  Hospitalists were asked to assist with co-medical management in this 
patient during her hospitalization.

 

ASSESSMENT/PLAN:

1.  Status post right total knee arthroplasty postop day, management will be 
per Orthopedic Surgery.  The patient will be placed on pain medication regimen 
in addition to a bowel regimen.  Her H and H will be trended.  She will have 
physical therapy and occupational therapy starting in the morning.  She will 
have the urinary catheter removed in the morning.

2.  Hypertension.  The patient's blood pressures have been on the soft side.  
She is not currently on any antihypertensives.  We will continue to monitor her 
blood pressure.

3.  History of asthma.  The patient will be continued on her Singulair, Dulera, 
Allegra, and albuterol inhaler as needed.

4.  Anxiety.  The patient will be continued on her home propranolol, clonazepam
, venlafaxine, and BuSpar.

5.  Cervical dystonia.  Continue clonazepam.

5.  Fluid, electrolytes, and nutrition.  The patient will be on a clear liquid 
advanced diet as tolerated.

6.  DVT prophylaxis.  The patient will have Lovenox bridge to warfarin per 
Orthopedic Surgery in addition to SCDs.

7.  Code status.  Full code.

8.  Disposition.  Inpatient with disposition per Orthopedic Surgery. 



TIME SPENT:  Time for this consultation was approximately 60 minutes, greater 
than half of that was spent with the patient and her  discussing 
medications, past medical history, and the events leading up to her arrival 
today.

 

Reviewed by VIRGILIO PENALOZA  03/17/18  1735

 

[id number]/[facility] #: [id number]

DANE

## 2018-03-15 NOTE — RAD
INDICATION:  Status post total right knee replacement surgery.



TECHNIQUE: 2 views of the right lower leg were obtained.



FINDINGS: The patient is status post total right knee replacement surgery. The bones and

prostheses are in normal alignment. No fracture is seen.



There is a surgical drain adjacent to the anterior distal femur.



IMPRESSION:  STATUS POST TOTAL RIGHT KNEE REPLACEMENT SURGERY.

## 2018-03-15 NOTE — XMS REPORT
Adarsh Fairchild

 Created on:2018



Patient:Adarsh Fairchild

Sex:Female

:1960

External Reference #:[id number]





Demographics







 Address  [address] [place] [address]

 

 Home Phone  [phone]

 

 Mobile Phone  [phone]

 

 Work Phone  [phone]

 

 Email Address  [email or web address]

 

 Preferred Language  English

 

 Marital Status  Not  Or 

 

 Jain Affiliation  Unknown

 

 Race  White

 

 Ethnic Group  Not  Or 









Author







 Organization  [facility]

 

 Address  [address] [address]

 

 Phone  [phone]









Support







 Name  Relationship  Address  Phone

 

 Alexei Fairchild  Unavailable  [phone]









Care Team Providers







 Name  Role  Phone

 

 Mali Antonio MD  Primary Care Physician  Unavailable









Payers







 Type  Date  Identification Numbers  Payment Provider  Subscriber

 

 Commercial  Effective:  Policy Number: [id number]  BS [employer]  Adarsh Fairchild



   2012      









 PayID: [id number]  [address] [address]









 [place] [address]









 Workers Compensation  Expires:  Policy Number:  TST Workers  Adarsh Fairchild



   2012  [id number]  Comp  









 Onset: 2011  PayID: TSTSC  [address]









 [place] [address]









 Medigap Part B  Effective: 2010  Policy Number:  [place]  Adarsh Fairchild



     [id number]    









 Expires: 2011  PayID: [id number]  [address] [address]









 [place] [address]









 Workers Compensation  Expires: 2012  Policy Number:  TSTWC  Adarsh Fairchild



     [id number]    









 Onset: 2011  PayID: [id number]  [address] [place] [address]







Advance Directives







 Type  Date  Description  Status  Comment

 

 Other Directive  2018  Health Care Proxy  Current and Verified  







Problems







 Date  Description  Provider  Status

 

 Onset: 2011  Benign essential hypertension  Radha Jackson N.PMimi  Active

 

 Onset: 2013  Ankylosing spondylitis  Honorio Shah M.D.  Active

 

 Onset: 2013  Spinal stenosis of lumbar region  Honorio Shah M.D.  
Active

 

 Onset: 2014  Strain of rotator cuff capsule  Honorio Shah M.D.  Active

 

 Onset: 2014  Chronic pharyngitis  Honorio Shah M.D.  Active

 

 Onset: 2014  Hyperlipidemia  Honorio Shah M.D.  Active

 

 Onset: 2014  Cervical spondylosis without  Honorio Shah M.D.  Active



   myelopathy    

 

 Onset: 2015  Chronic pain syndrome  Honorio Shah M.D.  Active

 

 Onset: 2016  Anxiety  Mali Antonio M.D.  Active

 

 Onset: 2017  Lumbosacral stenosis  Zsofilorrie Perez, FNP  Active

 

 Onset: 2017  Localized, secondary osteoarthritis  Laure Jeter M.D.  
Active

 

 Onset: 2017  Isolated cervical dystonia  Mali Antonio M.D.  Active







Family History







 Date  Family Member(s)  Problem(s)  Comments

 

   General  heart trouble  

 

   General  diabetes  

 

 :  (age 82  Father   due to Heart  heart aneurysm, CEA, also



 Years)    Disease  ankylosing spondylitis



       and many other problems

 

   Mother  Diabetes  

 

 :  (age 61  First Brother   due to  



 Years)    Castleman's disease  

 

   First Sister  Alive And Well  







Social History







 Type  Date  Description  Comments

 

 Marital Status      

 

 Occupation    Teacher  [employer], special



       education

 

 Occupation    Retired  doing some substituting,



       tutoring

 

 Cigarette Use    Never Smoked Cigarettes  

 

 ETOH Use    Denies alcohol use  

 

 Smoking    Patient has never smoked  

 

 Exercise Type/Frequency    Exercises regularly  







Allergies, Adverse Reactions, Alerts







 Date  Description  Reaction  Status  Severity  Comments

 

 2010  Biaxin  Urticaria  active  Moderate to  



         Severe  

 

 10/07/2014  Corn  Anaphylaxis,  active  Moderate to  



     Urticaria    Severe  

 

 07/10/2015  Envioromental  Allergic asthma  active  Severe  

 

 2017  Acetaminophen / Codeine    active    







Medications







 Medication  Date  Status  Form  Strength  Qnty  SIG  Indications  Ordering



                 Provider

 

 Buspirone HCL    Active  Tablets  5mg  60tab  1 by    Paco



           s  mouth    Leodan, NP



             twice a    



             day    

 

 Hydrocodone-Aceta    Active  Tablets  5-325mg  90tab  1 tab by    Laure alfaro          s  mouth    Corona,



             every 6    M.D.



             hours as    



             needed    



             for pain    

 

 Montelukast  07/10  Active  Tablets  10mg  90tab  1 tab by  T78.40xA  Zsofia



 Sodium  /2015        s  mouth    Chris,



             every    FNP



             day.    

 

 Epipen 2-Ranjeet  10/07  Active  Solution  0.3mg/0.3  2apk    995.3      Auto-Inject  ML        Endo, M.D.

 

 Venlafaxine HCL    Active  Caps ER  150mg  60cap  take 1        24HR    s  capsule    Cotton,



             by mouth    M.D.



             twice    



             daily    

 

 Trazodone HCL    Active  Tablets  50mg  30tab  take 1            s  tablet by    Cotton,



             mouth    M.D.



             nightly    



             at    



             bedtime    



             as needed    

 

 Propranolol HCL    Active  Tablets  20mg  180ta  1 by            bs  mouth    Cotton,



             twice a    M.D.



             day    

 

 Clonazepam    Active  Tablets  0.5mg  60tab  Take 1/    Mail        s  Tablet By    Cotton,



             Mouth In    M.D.



             The    



             Morning    



             And In    



             The    



             Afternoon    



             , And 1    



             Tablet At    



             Night    



             *Max    



             2/Day*    

 

 Dymista    Active  Suspension  137-50mcg    2 spray  461.8  Unknown



   /0000      /Act    each    



             nostril    



             bid    

 

 Dulera    Active  Aerosol  200-5mcg/        Danielewic



   /0000      Act        z,



                 Delaney,



                 SERJIO

 

 Pazeo    Active  Solution  0.7%        Unknown



   /0000              

 

 Ventolin HFA    Active  Aerosol  108(90Bas        Unknown



   /0000      e)        



         mcg/Act        

 

 Oxybutynin    Active  Tablets  5mg    1 PO qd    Unknown



 Chloride  /0000              

 

 Cemill/Bioflavono    Active  Tablets      take 1    Unknown



 ids            tab daily    

 

 Calcium 500+D    Active  Tablets  500-200mg    1 by    Unknown



   /0000      -Unit    mouth    



             twice a    



             day    

 

 B-Stress    Active  Capsules          Unknown



   /0000              

 

 Fish Oil    Active  Capsules  1000mg    1 by    Unknown



   /0000          mouth    



             every day    

 

 Clarinex    Active  Tablets  5mg    once    Unknown



   /0000          daily    

 

                 

 

 Cephalexin    Hx  Tablets  500mg  20tab  1 by            s  mouth    Corona,



   -          four    M.D.



             times           day for 5    



             days    

 

 Augmentin    Hx  Tablets  875-125mg  20tab  one by  J20.9          s  mouth    Varn, N.P.



   -          every 12    



             hours for    



   /2016          ten days    

 

 Benzonatate    Hx  Capsules  100mg  30cap  one by  J20.9          s  mouth    Varn, N.P.



   -          three    



   12/21          times    



   /2016          daily as    



             needed    



             for cough    

 

 Fluticasone    Hx  Suspension  50mcg/Act  16gm  2 sprays  461.8  Paco



 Propionate            each    Leodan, NP



   -          nostril    



   10/13          qd           weeks    

 

 Levofloxacin    Hx  Tablets  500mg  10tab  one by    Paco



           s  mouth    Leodan, NP



   -          daily for    



             10 days    



   /2015              

 

 Augmentin    Hx  Tablets  875-125mg  14tab  1 tablet  461.8  Paco



           s  by mouth    Leodan, NP



   -          q12 hours    



             for           days    

 

 Fluticasone    Hx  Suspension  50mcg/Act  16gm  2 sprays  461.8  Paco



 Propionate            each    Leodan, NP



   -          nostril    



             qd           weeks    

 

 Cheratussin ac    Hx  Syrup  100-10mg/  118ml  take 5-10  461.8  Paco



         5ML    millilite    Leodan, NP



   -          rs every    



             4-6 hours    



             as needed    



             for    



             cough.    

 

 Ventolin HFA    Hx  Aerosol  108(90Bas  18uni  Inhale  461.8  Paco



         e)  ts  Two Puffs    Leodan, NP



   -      mcg/Act    By Mouth    



   10/13          Four    



   /2015          Times    



             Daily as    



             Needed    

 

 Ciclopirox    Hx  Solution  8%  6.600  apply to  110.1          ml  affected    Varn, N.P.



   -          toenails    



             and    



             surroundi    



             ng skin    



             at    



             bedtime    

 

 Ergocalciferol    Hx  Capsules  31070Qrzd  8caps  1 cap by    Leonor          mouth    Janet,



   -          every    M.D., FACP



   10/07          week    



   /2014              

 

 Amoxicillin    Hx  Capsules  500mg  30cap  1 tab po  472.1          s  tid for    Endo, M.D.



   -          10 days    



                 

 

 Lidoderm    Hx  Patches  5%  30uni  topical  724.02          ts  10 hours    Endo, M.D.



   -              



                 

 

 Cyclobenzaprine    Hx  Tablets  5mg  90tab  1 hs and  724.02  Honorio



 HCL  /2013        s  in 3 days    Endo, M.D.



   -          increase    



             to 2 h s    



             and add 1    



             prn    

 

 Venlafaxine HCL    Hx  Caps ER  150mg  60cap  Take One    Leonor



 ER      24HR    s  Capsule    Janet,



   -          By Mouth    M.D., FACP



             Twice    



             Daily    

 

 Indomethacin    Hx  Capsules  25mg  360ca  take 1  M45.7  ofi        ps  capsules    Chris,



   -          by mouth    FNP



             twice    



             daily as    



             needed    

 

 Hydrocodone/Aceta  10/10  Hx  Tablets  5-325mg  30tab  1-2 tabs  728.85  Leonor



 minophen          s  by mouth    Janet,



   -          every 6    M.D., FACP



             hours as    



   /2013          needed    

 

 Cyclobenzaprine  10/10  Hx  Tablets  5mg  30tab  take one  728.85  Leonor



 HCL          s  at    Janet,



   -          bedtime    M.D., FACP



             as needed    



                 

 

 Indomethacin ER  1010  Hx  Capsules ER  75mg    take one              capsule    GIBRAN Shah



   -          by mouth    



             twice a    



             day with    



             food    

 

 Indomethacin ER  10/10  Hx  Capsules ER  75mg    take one              capsule    GIBRAN hSah



   -          by mouth    



             twice a    



             day with    



             food    

 

 Metoprolol    Hx  Tablets ER  50mg  90tab  Take One    Leonor



 Succinate     24HR    s  Tablet By    Janet



   -          Mouth One    M.D., FACP



             Time    



             Daily    

 

 Hydroxychloroquin    Hx  Tablets  200mg  60tab  Take One    Honorio



 e Sulfate          s  Tablet By    GIBRAN Shah



   -          Mouth    



   12/06          Twice    



   /2013          Daily    

 

 Sulfasalazine    Hx  Tablets  500mg  60tab  2 po bid            s      GIBRAN Shah



   -              



                 

 

 Cyclobenzaprine    Hx  Tablets  5mg  30tab  1 bid    Leonor



 HCL          s      Heidy Gonzales M.D., FACP



                 

 

 Indomethacin    Hx  Capsules  25mg  120ca  Take Two            ps  Capsules    GIBRAN Shah



   -          By Mouth    



   10/10          Twice    



             Daily    

 

 Effexor    Hx  Caps ER  150mg  60cap  1 by        24HR    s  mouth    Janet,



   -          twice    M.D., FACP



   07/25          daily    



   /2014              

 

 Effexor    Hx  Caps ER  150mg  60cap  1 by    Leonor    24HR    s  mouth    Janet,



   -          twice    M.D., FACP



   04/11          daily    



   /2011              

 

 Augmentin    Hx  Tablets  500-125mg                      Janet,



   -              M[facility]., [facility]



                 

 

 Augmentin    Hx  Tablets  875-125mg  20tab  one by            s  mouth    Janet,



   -          every 12    M.D., FACP



             hours for    



   /2011          ten days    

 

 Veramyst    Hx  Suspension  27.5mcg/S  1Mont  2 sprays          pray  h  each    Janet,



   -          nostril    M.D., [facility]



   09/28          twice    



   /2011          daily    

 

 Anamantle HC    Hx  Kit  3-0.5%  20uni  Apply            ts  Twice    Cotton,



   -          Daily For    M.D.



   12/28          10 Days    



   /2010              

 

 Toprol XL    Hx  Tablets ER  50mg  90tab  1 tablet        24HR    s  daily    Janet,



   -              M.D., [facility]



                 

 

 Ivermectin    Hx    200mcg  5unit  take 5 po            s  in 1 dose    Janet,



   -          on empty    M.D., [facility]



   12/28          stomach    



   /2010          &amp;    



             repeat in    



             2 weeks    

 

 Plaquenil    Hx  Tablets  200mg  60tab  1 tablet    Unknown



   /0000        s  twice a    



   -          day    



                 

 

 Xyzal    Hx  Tablets  5mg  30tab  1 tablet    Unknown



   /0000        s  daily as    



   -          needed    



                 

 

 Effexor XR    Hx  Caps ER  75mg  30cap  1 capsule    Leonor



       24HR    s  twice a    Janet,



   -          day    M.D., FACP



                 

 

 Indomethacin 25MG    Hx  Capsules  Unknown  60cap  2 tablets    Honorio



   /0000        s  twice a    Endo, M.D.



   -          day    



                 

 

 Vivelle-Dot    Hx  Patches  0.075mg/2  24uni  2 patches    Unknown



   /0000    Biweek  4HR  ts  weekly    



   -              



   07/10              



   /2015              

 

 Metoprolol    Hx  Tablets ER  50mg  90tab  1 by    Unknown



 Succinate ER  /0000    24HR    s  mouth    



   -          every day    



                 

 

 Allegra Allergy    Hx  Tablets      1 tab by    Unknown



   /0000          mouth at    



   -          dinner    



   02/26          time    



   /2018              

 

 Zyrtec Allergy    Hx  Tablets  10mg    1 by    Unknown



   /0000          mouth    



   -          every Am    



                 

 

 Tumersaid  0000  Hx  Tablets      1 by    Unknown



   /0000          mouth    



   -          daily.    



                 







Medications Administered in Office







 Medication  Date  Status  Form  Strength  Qnty  SIG  Indications  Ordering



                 Provider

 

 Synvisc Or    Administered  Injection          Laure



 Synvisc-One  Shira Jeter M.D.



 Injection 1 MG                

 

 Synvisc Or    Administered  Injection          Laure



 Synvisc-One  Shira Jeter M.D.



 Injection 1 MG                

 

 Synvisc Or    Administered  Injection          Laure



 Synvisc-One  Shira Jeter M.D.



 Injection 1 MG                

 

 Depomedrol    Administered  Injection          Laure



 40MG  Shira Jeter M.D.







Immunizations







 CPT Code  Status  Date  Vaccine  Lot #

 

 [id number]  Given  2017  Influenza Virus Vaccine, Quadrivalent, Split,  



       Preservative Free  

 

   Given  2017  Fluvirin Im 3Yrs And Older  

 

 [id number]  Given  10/29/2016  Influenza Virus Vaccine, Quadrivalent, Split,  



       Preservative Free  

 

 72766  Given  10/29/2016  Influenza Virus Vaccine, Quadrivalent, Split,  



       Preservative Free  

 

 38468  Given  10/29/2016  Influenza Virus Vaccine, Quadrivalent, Split,  



       Preservative Free  

 

   Given  10/07/2015  Fluvirin Im 3Yrs And Older  

 

   Given  10/01/2014  Fluvirin Im 3Yrs And Older  

 

   Given  10/01/2014  Afluria Vaccine  

 

 25705  Given  2014  Tdap - Tetanus/Diptheria/Acellular Pertussis  [id number]







Vital Signs







 Date  Vital  Result  Comment

 

 2018  Height  63.5 inches  5'3.50"









 Weight  137.00 lb  

 

 Heart Rate  80 /min  

 

 BP Systolic  126 mmHg  

 

 BP Diastolic  86 mmHg  

 

 BMI (Body Mass Index)  23.9 kg/m2  









 2018  Weight  139.00 lb  with shoes









 Heart Rate  81 /min  

 

 BP Systolic  114 mmHg  

 

 BP Diastolic  86 mmHg  

 

 O2 % BldC Oximetry  98 %  









 2017  Height  63.5 inches  5'3.50"









 Weight  135.00 lb  

 

 Heart Rate  79 /min  

 

 BP Systolic  138 mmHg  

 

 BP Diastolic  94 mmHg  

 

 Pain Level  7  

 

 BMI (Body Mass Index)  23.5 kg/m2  









 2017  Height  63 inches  5'3"









 Weight  137.00 lb  

 

 BP Systolic  122 mmHg  

 

 BP Diastolic  80 mmHg  

 

 Respiratory Rate  18 /min  

 

 Pain Level  2  

 

 BMI (Body Mass Index)  24.3 kg/m2  









 2017  Height  63 inches  5'3"









 Weight  137.00 lb  

 

 BP Systolic  118 mmHg  

 

 BP Diastolic  76 mmHg  

 

 Respiratory Rate  18 /min  

 

 Pain Level  3  

 

 BMI (Body Mass Index)  24.3 kg/m2  









 2017  Height  63 inches  5'3"









 Weight  137.00 lb  

 

 Heart Rate  84 /min  

 

 BP Systolic  124 mmHg  

 

 BP Diastolic  77 mmHg  

 

 Respiratory Rate  17 /min  

 

 Body Temperature  98.4 F  

 

 Pain Level  4  

 

 BMI (Body Mass Index)  24.3 kg/m2  









 2017  Height  63 inches  5'3"









 Weight  137.00 lb  

 

 Heart Rate  87 /min  

 

 BP Systolic  114 mmHg  

 

 BP Diastolic  80 mmHg  

 

 Body Temperature  98.7 F  

 

 Pain Level  4  

 

 O2 % BldC Oximetry  97 %  

 

 BMI (Body Mass Index)  24.3 kg/m2  









 2017  Height  63 inches  5'3"









 Weight  138.25 lb  

 

 Heart Rate  74 /min  

 

 BP Systolic  130 mmHg  

 

 BP Diastolic  80 mmHg  

 

 Body Temperature  97.5 F  

 

 O2 % BldC Oximetry  95 %  

 

 BMI (Body Mass Index)  24.5 kg/m2  









 2017  Height  63 inches  5'3"









 Weight  140.00 lb  

 

 Heart Rate  76 /min  

 

 BP Systolic  126 mmHg  

 

 BP Diastolic  70 mmHg  

 

 Respiratory Rate  15 /min  

 

 Body Temperature  97.2 F  

 

 Pain Level  5  

 

 BMI (Body Mass Index)  24.8 kg/m2  









 2017  Weight  137.00 lb  









 Heart Rate  83 /min  

 

 BP Systolic  140 mmHg  

 

 BP Diastolic  100 mmHg  

 

 BP Systolic Sitting  140 mmHg  

 

 BP Diastolic Sitting  92 mmHg  

 

 O2 % BldC Oximetry  97 %  









 2017  Height  63 inches  5'3"









 Weight  138.25 lb  

 

 Heart Rate  84 /min  

 

 BP Systolic Sitting  120 mmHg  

 

 BP Diastolic Sitting  70 mmHg  

 

 Respiratory Rate  14 /min  

 

 Pain Level  4  

 

 BMI (Body Mass Index)  24.5 kg/m2  









 2016  Weight  139.00 lb  









 Heart Rate  86 /min  

 

 BP Systolic Sitting  128 mmHg  

 

 BP Diastolic Sitting  82 mmHg  

 

 Respiratory Rate  15 /min  

 

 Body Temperature  97.4 F  

 

 O2 % BldC Oximetry  98 %  









 2016  Height  63 inches  5'3"









 Weight  143.00 lb  

 

 Heart Rate  88 /min  

 

 BP Systolic Sitting  130 mmHg  

 

 BP Diastolic Sitting  80 mmHg  

 

 Respiratory Rate  14 /min  

 

 Pain Level  4  

 

 BMI (Body Mass Index)  25.3 kg/m2  









 2016  Height  63 inches  5'3"









 Weight  140.00 lb  

 

 Heart Rate  88 /min  

 

 BP Systolic Sitting  134 mmHg  

 

 BP Diastolic Sitting  82 mmHg  

 

 Respiratory Rate  15 /min  

 

 Body Temperature  98.2 F  

 

 O2 % BldC Oximetry  98 %  

 

 BMI (Body Mass Index)  24.8 kg/m2  









 10/13/2015  Height  63 inches  5'3"









 Weight  140.00 lb  

 

 Heart Rate  76 /min  

 

 BP Systolic Sitting  120 mmHg  

 

 BP Diastolic Sitting  70 mmHg  

 

 Pain Level  4  

 

 BMI (Body Mass Index)  24.8 kg/m2  









 07/10/2015  Weight  138.00 lb  









 Heart Rate  76 /min  

 

 BP Systolic Sitting  130 mmHg  

 

 BP Diastolic Sitting  88 mmHg  

 

 Pain Level  6  









 2015  Weight  144.00 lb  









 Heart Rate  77 /min  

 

 BP Systolic Sitting  124 mmHg  

 

 BP Diastolic Sitting  83 mmHg  

 

 Body Temperature  97.8 F  









 2015  Height  63 inches  5'3"









 Weight  144.00 lb  

 

 Heart Rate  88 /min  

 

 BP Systolic Sitting  124 mmHg  

 

 BP Diastolic Sitting  80 mmHg  

 

 Body Temperature  99.0 F  

 

 Pain Level  8  

 

 BMI (Body Mass Index)  25.5 kg/m2  









 2015  Height  63 inches  5'3"









 Weight  144.00 lb  

 

 Heart Rate  74 /min  

 

 BP Systolic  124 mmHg  

 

 BP Diastolic  86 mmHg  

 

 Body Temperature  98.3 F  

 

 O2 % BldC Oximetry  96 %  

 

 BMI (Body Mass Index)  25.5 kg/m2  









 2014  Height  63 inches  5'3"









 Weight  143.00 lb  

 

 Heart Rate  68 /min  

 

 BP Systolic Sitting  128 mmHg  

 

 BP Diastolic Sitting  88 mmHg  

 

 BMI (Body Mass Index)  25.3 kg/m2  









 2014  Height  63 inches  5'3"









 Weight  145.00 lb  

 

 Heart Rate  68 /min  

 

 BP Systolic Sitting  128 mmHg  

 

 BP Diastolic Sitting  88 mmHg  

 

 Pain Level  5  

 

 BMI (Body Mass Index)  25.7 kg/m2  









 10/07/2014  Height  63 inches  5'3"









 Weight  143.00 lb  

 

 Heart Rate  80 /min  

 

 BP Systolic Sitting  130 mmHg  

 

 BP Diastolic Sitting  86 mmHg  

 

 Pain Level  7  

 

 BMI (Body Mass Index)  25.3 kg/m2  









 2014  Height  63 inches  5'3"









 Weight  141.00 lb  

 

 Heart Rate  76 /min  

 

 BP Systolic Sitting  124 mmHg  

 

 BP Diastolic Sitting  80 mmHg  

 

 BMI (Body Mass Index)  25.0 kg/m2  









 2014  Height  63 inches  5'3"









 Weight  141.50 lb  

 

 Heart Rate  76 /min  

 

 BP Systolic Sitting  132 mmHg  

 

 BP Diastolic Sitting  92 mmHg  

 

 Pain Level  5  

 

 BMI (Body Mass Index)  25.1 kg/m2  









 2014  Height  63 inches  5'3"









 Weight  143.25 lb  

 

 Heart Rate  88 /min  

 

 BP Systolic Sitting  118 mmHg  

 

 BP Diastolic Sitting  78 mmHg  

 

 BMI (Body Mass Index)  25.4 kg/m2  









 2014  Height  63 inches  5'3"









 Weight  148.00 lb  

 

 Heart Rate  79 /min  

 

 BP Systolic Sitting  126 mmHg  

 

 BP Diastolic Sitting  88 mmHg  

 

 BMI (Body Mass Index)  26.2 kg/m2  









 2014  Height  63 inches  5'3"









 Weight  144.00 lb  

 

 Heart Rate  76 /min  

 

 BP Systolic Sitting  120 mmHg  

 

 BP Diastolic Sitting  68 mmHg  

 

 BMI (Body Mass Index)  25.5 kg/m2  









 2014  Height  63 inches  5'3"









 Weight  147.25 lb  

 

 Heart Rate  78 /min  

 

 BP Systolic Sitting  140 mmHg  

 

 BP Diastolic Sitting  98 mmHg  

 

 BMI (Body Mass Index)  26.1 kg/m2  









 2013  Weight  146.00 lb  









 Heart Rate  78 /min  

 

 BP Systolic Sitting  122 mmHg  

 

 BP Diastolic Sitting  80 mmHg  

 

 Body Temperature  99.0 F  









 2013  Height  64 inches  5'4"









 Weight  142.00 lb  

 

 Heart Rate  77 /min  

 

 BP Systolic Sitting  122 mmHg  

 

 BP Diastolic Sitting  67 mmHg  

 

 BMI (Body Mass Index)  24.4 kg/m2  









 2013  Height  64 inches  5'4"









 Weight  141.00 lb  

 

 Heart Rate  84 /min  

 

 BP Systolic Sitting  118 mmHg  

 

 BP Diastolic Sitting  76 mmHg  

 

 BMI (Body Mass Index)  24.2 kg/m2  









 2012  Height  64 inches  5'4"









 Weight  149.50 lb  

 

 Heart Rate  72 /min  

 

 BP Systolic Sitting  128 mmHg  

 

 BP Diastolic Sitting  78 mmHg  

 

 BMI (Body Mass Index)  25.7 kg/m2  









 2012  Height  64 inches  5'4"









 Weight  152.00 lb  

 

 Heart Rate  64 /min  

 

 BP Systolic Sitting  114 mmHg  

 

 BP Diastolic Sitting  78 mmHg  

 

 BMI (Body Mass Index)  26.1 kg/m2  









 2011  Height  64 inches  5'4"









 Weight  149.00 lb  

 

 Heart Rate  74 /min  

 

 BP Systolic Sitting  120 mmHg  

 

 BP Diastolic Sitting  78 mmHg  

 

 BMI (Body Mass Index)  25.6 kg/m2  









 10/10/2011  Height  64 inches  5'4"









 Weight  150.00 lb  

 

 Heart Rate  68 /min  

 

 BP Systolic Sitting  114 mmHg  

 

 BP Diastolic Sitting  62 mmHg  

 

 BMI (Body Mass Index)  25.7 kg/m2  









 2011  Height  64 inches  5'4"









 Weight  150.00 lb  

 

 Heart Rate  68 /min  

 

 BP Systolic Sitting  112 mmHg  

 

 BP Diastolic Sitting  68 mmHg  

 

 BMI (Body Mass Index)  25.7 kg/m2  









 2011  Height  64 inches  5'4"









 Weight  150.00 lb  

 

 Heart Rate  68 /min  

 

 BP Systolic Sitting  122 mmHg  

 

 BP Diastolic Sitting  76 mmHg  

 

 Body Temperature  98.9 F  

 

 BMI (Body Mass Index)  25.7 kg/m2  









 2011  Height  64 inches  5'4"









 Weight  154.00 lb  

 

 Heart Rate  80 /min  

 

 BP Systolic  118 mmHg  

 

 BP Diastolic  70 mmHg  

 

 BMI (Body Mass Index)  26.4 kg/m2  









 2011  Weight  157.75 lb  









 Heart Rate  78 /min  

 

 BP Systolic  124 mmHg  

 

 BP Diastolic  88 mmHg  









 2010  Weight  152.00 lb  









 Heart Rate  78 /min  

 

 BP Systolic  116 mmHg  128/82

 

 BP Diastolic  90 mmHg  128/82

 

 Body Temperature  97.7 F  







Results







 Test  Date  Test  Result  H/L  Range  Note

 

 Order  2018  EKG  results given to jacinto      

 

 Type &amp; Screen  2018  Patient Blood Type  O Positive      









 Antibody Screen  NEGATIVE      









 CBC Auto Diff  2018  White Blood Count  7.4 10^3/uL    3.5-10.8  









 Red Blood Count  4.67 10^6/uL    4.0-5.4  

 

 Hemoglobin  14.1 g/dL    12.0-16.0  

 

 Hematocrit  42 %    35-47  

 

 Mean Corpuscular Volume  90 fL    80-97  

 

 Mean Corpuscular Hemoglobin  30 pg    27-31  

 

 Mean Corpuscular HGB Conc  34 g/dL    31-36  

 

 Red Cell Distribution Width  13 %    10.5-15  

 

 Platelet Count  177 10^3/uL    150-450  

 

 Mean Platelet Volume  10 um3    7.4-10.4  

 

 Abs Neutrophils  5.2 10^3/uL    1.5-7.7  

 

 Abs Lymphocytes  1.2 10^3/uL    1.0-4.8  

 

 Abs Monocytes  0.8 10^3/uL    0-0.8  

 

 Abs Eosinophils  0.1 10^3/uL    0-0.6  

 

 Abs Basophils  0 10^3/uL    0-0.2  

 

 Abs Nucleated RBC  0 10^3/uL      

 

 Granulocyte %  70.6 %    38-83  

 

 Lymphocyte %  15.5 %  Low  25-47  

 

 Monocyte %  11.3 %  High  0-7  

 

 Eosinophil %  2.0 %    0-6  

 

 Basophil %  0.6 %    0-2  

 

 Nucleated Red Blood Cells %  0.1      









 Basic Metabolic Panel  2018  Sodium  136 mmol/L    133-145  









 Potassium  4.1 mmol/L    3.5-5.0  

 

 Chloride  99 mmol/L  Low  101-111  

 

 Co2 Carbon Dioxide  30 mmol/L    22-32  

 

 Anion Gap  7 mmol/L    2-11  

 

 Glucose  80 mg/dL      

 

 Blood Urea Nitrogen  14 mg/dL    6-24  

 

 Creatinine  0.72 mg/dL    0.51-0.95  

 

 BUN/Creatinine Ratio  19.4    8-20  

 

 Calcium  9.8 mg/dL    8.6-10.3  

 

 Egfr Non-  83.2    &gt;60  

 

 Egfr   107.0    &gt;60  1









 Inr/Protime  2018  Inr  0.94    0.77-1.02  

 

 Urine Culture And  2018  Urine Culture  SEE RESULT BELOW      2



 Sensitivities            

 

 Lipid Profile  07/10/2017  Triglycerides  45 mg/dL      3



 (Trig/Chol/HDL)            









 Cholesterol  221 mg/dL      4

 

 HDL Cholesterol  71.7 mg/dL      5

 

 LDL Cholesterol  140 mg/dL      6









 CBC Auto Diff  2017  White Blood Count  7.9 10^3/uL    3.5-10.8  









 Red Blood Count  4.57 10^6/uL    4.0-5.4  

 

 Hemoglobin  13.8 g/dL    12.0-16.0  

 

 Hematocrit  41 %    35-47  

 

 Mean Corpuscular Volume  91 fL    80-97  

 

 Mean Corpuscular Hemoglobin  30 pg    27-31  

 

 Mean Corpuscular HGB Conc  33 g/dL    31-36  

 

 Red Cell Distribution Width  13 %    10.5-15  

 

 Platelet Count  155 10^3/uL    150-450  

 

 Mean Platelet Volume  11 um3  High  7.4-10.4  

 

 Abs Neutrophils  5.8 10^3/uL    1.5-7.7  

 

 Abs Lymphocytes  1.3 10^3/uL    1.0-4.8  

 

 Abs Monocytes  0.6 10^3/uL    0-0.8  

 

 Abs Eosinophils  0.1 10^3/uL    0-0.6  

 

 Abs Basophils  0.1 10^3/uL    0-0.2  

 

 Abs Nucleated RBC  0.01 10^3/uL      

 

 Granulocyte %  73.3 %    38-83  

 

 Lymphocyte %  16.7 %  Low  25-47  

 

 Monocyte %  7.6 %    1-9  

 

 Eosinophil %  1.0 %    0-6  

 

 Basophil %  1.4 %    0-2  

 

 Nucleated Red Blood Cells %  0.1      









 Comp Metabolic Panel  2017  Sodium  138 mmol/L    133-145  









 Potassium  4.0 mmol/L    3.5-5.0  

 

 Chloride  103 mmol/L    101-111  

 

 Co2 Carbon Dioxide  33 mmol/L  High  22-32  

 

 Anion Gap  2 mmol/L    2-11  

 

 Glucose  106 mg/dL  High    

 

 Blood Urea Nitrogen  16 mg/dL    6-24  

 

 Creatinine  0.70 mg/dL    0.51-0.95  

 

 BUN/Creatinine Ratio  22.9  High  8-20  

 

 Calcium  9.3 mg/dL    8.6-10.3  

 

 Total Protein  6.6 g/dL    6.4-8.9  

 

 Albumin  4.0 g/dL    3.2-5.2  

 

 Globulin  2.6 g/dL    2-4  

 

 Albumin/Globulin Ratio  1.5    1-3  

 

 Total Bilirubin  0.30 mg/dL    0.2-1.0  

 

 Alkaline Phosphatase  68 U/L      

 

 Alt  22 U/L    7-52  

 

 Ast  23 U/L    13-39  

 

 Egfr Non-  86.2    &gt;60  

 

 Egfr   110.9    &gt;60  7









 Laboratory test finding  2017  C Reactive Protein  1.12 mg/L    &lt; 
5.00  8









 Erythrocyte Sed Rate  20 mm/Hr    0-30  









 CBC Auto Diff  2016  White Blood Count  7.6 10^3/uL    3.5-10.8  









 Red Blood Count  4.38 10^6/uL    4.0-5.4  

 

 Hemoglobin  13.2 g/dL    12.0-16.0  

 

 Hematocrit  40 %    35-47  

 

 Mean Corpuscular Volume  92 fL    80-97  

 

 Mean Corpuscular Hemoglobin  30 pg    27-31  

 

 Mean Corpuscular HGB Conc  33 g/dL    31-36  

 

 Red Cell Distribution Width  13 %    10.5-15  

 

 Platelet Count  171 10^3/uL    150-450  

 

 Mean Platelet Volume  10 um3    7.4-10.4  

 

 Abs Neutrophils  4.7 10^3/uL    1.5-7.7  

 

 Abs Lymphocytes  1.9 10^3/uL    1.0-4.8  

 

 Abs Monocytes  0.8 10^3/uL    0-0.8  

 

 Abs Eosinophils  0.1 10^3/uL    0-0.6  

 

 Abs Basophils  0 10^3/uL    0-0.2  

 

 Abs Nucleated RBC  0 10^3/uL      

 

 Granulocyte %  62.4 %    38-83  

 

 Lymphocyte %  24.8 %  Low  25-47  

 

 Monocyte %  10.6 %  High  1-9  

 

 Eosinophil %  1.6 %    0-6  

 

 Basophil %  0.6 %    0-2  

 

 Nucleated Red Blood Cells %  0.1      









 Comp Metabolic Panel  2016  Sodium  133 mmol/L    133-145  









 Potassium  3.9 mmol/L    3.5-5.0  

 

 Chloride  97 mmol/L  Low  101-111  

 

 Co2 Carbon Dioxide  30 mmol/L    22-32  

 

 Anion Gap  6 mmol/L    2-11  

 

 Glucose  80 mg/dL      

 

 Blood Urea Nitrogen  16 mg/dL    6-24  

 

 Creatinine  0.70 mg/dL    0.51-0.95  

 

 BUN/Creatinine Ratio  22.9  High  8-20  

 

 Calcium  9.3 mg/dL    8.6-10.3  

 

 Total Protein  6.7 g/dL    6.4-8.9  

 

 Albumin  4.4 g/dL    3.2-5.2  

 

 Globulin  2.3 g/dL    2-4  

 

 Albumin/Globulin Ratio  1.9    1-3  

 

 Total Bilirubin  0.30 mg/dL    0.2-1.0  

 

 Alkaline Phosphatase  65 U/L      

 

 Alt  23 U/L    7-52  

 

 Ast  20 U/L    13-39  

 

 Egfr Non-  86.6    &gt;60  

 

 Egfr   111.3    &gt;60  9









 Laboratory test finding  2016  C Reactive Protein  1.23 mg/L    &lt; 
5.00  10









 Erythrocyte Sed Rate  18 mm/Hr    0-30  









 CBC Auto Diff  10/13/2015  White Blood Count  7.9 10^3/uL    4.8-10.8  









 Red Blood Count  4.49 10^6/uL    4.0-5.4  

 

 Hemoglobin  13.9 g/dL    12.0-16.0  

 

 Hematocrit  42 %    35-47  

 

 Mean Corpuscular Volume  95 fL    80-97  

 

 Mean Corpuscular Hemoglobin  31 pg    27-31  

 

 Mean Corpuscular HGB Conc  33 g/dL    31-36  

 

 Red Cell Distribution Width  13 %    10.5-15  

 

 Platelet Count  152 10^3/uL    150-450  

 

 Mean Platelet Volume  10 um3    7.4-10.4  

 

 Abs Neutrophils  5.3 10^3/uL    1.5-7.7  

 

 Abs Lymphocytes  1.6 10^3/uL    1.0-4.8  

 

 Abs Monocytes  0.9 10^3/uL  High  0-0.8  

 

 Abs Eosinophils  0.1 10^3/uL    0-0.6  

 

 Abs Basophils  0.1 10^3/uL    0-0.2  

 

 Abs Nucleated RBC  0.01 10^3/uL      

 

 Granulocyte %  67.1 %    38-83  

 

 Lymphocyte %  19.5 %  Low  25-47  

 

 Monocyte %  11.7 %  High  1-9  

 

 Eosinophil %  1.0 %    0-6  

 

 Basophil %  0.7 %    0-2  

 

 Nucleated Red Blood Cells %  0.1      









 Comp Metabolic Panel  10/13/2015  Sodium  135 mmol/L    133-145  









 Potassium  4.4 mmol/L    3.5-5.0  

 

 Chloride  100 mmol/L  Low  101-111  

 

 Co2 Carbon Dioxide  30 mmol/L    22-32  

 

 Anion Gap  5 mmol/L    2-11  

 

 Glucose  83 mg/dL      

 

 Blood Urea Nitrogen  17 mg/dL    6-24  

 

 Creatinine  0.71 mg/dL    0.51-0.95  

 

 BUN/Creatinine Ratio  23.9  High  8-20  

 

 Calcium  9.8 mg/dL    8.6-10.3  

 

 Total Protein  6.6 g/dL    6.4-8.9  

 

 Albumin  4.4 g/dL    3.2-5.2  

 

 Globulin  2.2 g/dL    2-4  

 

 Albumin/Globulin Ratio  2.0    1-3  

 

 Total Bilirubin  0.40 mg/dL    0.2-1.0  

 

 Alkaline Phosphatase  72 U/L      

 

 Alt  39 U/L    7-52  

 

 Ast  30 U/L    13-39  

 

 Egfr Non-  85.5    &gt;60  

 

 Egfr   109.9    &gt;60  11









 Laboratory test finding  10/13/2015  Erythrocyte Sed Rate  17 mm/Hr    0-30  









 C Reactive Protein  &lt; 1.00 mg/L    &lt; 5.00  12









 Laboratory test finding  2015  Throat Culture  (SEE NOTE)      13

 

 CBC Auto Diff  2015  White Blood Count  6.7 10^3/uL    4.8-10.8  









 Red Blood Count  4.34 10^6/uL    4.0-5.4  

 

 Hemoglobin  13.5 g/dL    12.0-16.0  

 

 Hematocrit  40 %    35-47  

 

 Mean Corpuscular Volume  93 fL    80-97  

 

 Mean Corpuscular Hemoglobin  31 pg    27-31  

 

 Mean Corpuscular HGB Conc  34 g/dL    31-36  

 

 Red Cell Distribution Width  13 %    10.5-15  

 

 Platelet Count  138 10^3/uL  Low  150-450  

 

 Mean Platelet Volume  12 um3  High  7.4-10.4  

 

 Abs Neutrophils  3.9 10^3/uL    1.5-7.7  

 

 Abs Lymphocytes  1.8 10^3/uL    1.0-4.8  

 

 Abs Monocytes  0.8 10^3/uL    0-0.8  

 

 Abs Eosinophils  0.1 10^3/uL    0-0.6  

 

 Abs Basophils  0.1 10^3/uL    0-0.2  

 

 Abs Nucleated RBC  0 10^3/uL      

 

 Granulocyte %  58.6 %    38-83  

 

 Lymphocyte %  27.1 %    25-47  

 

 Monocyte %  11.9 %  High  1-9  

 

 Eosinophil %  1.5 %    0-6  

 

 Basophil %  0.9 %    0-2  

 

 Nucleated Red Blood Cells %  0      









 Comp Metabolic Panel  2015  Sodium  136 mmol/L    133-145  









 Potassium  4.5 mmol/L    3.5-5.0  

 

 Chloride  103 mmol/L    101-111  

 

 Co2 Carbon Dioxide  29 mmol/L    22-32  

 

 Anion Gap  4 mmol/L    2-11  

 

 Glucose  90 mg/dL      

 

 Blood Urea Nitrogen  15 mg/dL    6-24  

 

 Creatinine  0.72 mg/dL    0.51-0.95  

 

 BUN/Creatinine Ratio  20.8  High  8-20  

 

 Calcium  9.0 mg/dL    8.6-10.3  

 

 Total Protein  6.6 g/dL    6.4-8.9  

 

 Albumin  4.2 g/dL    3.2-5.2  

 

 Globulin  2.4 g/dL    2-4  

 

 Albumin/Globulin Ratio  1.8    1-3  

 

 Total Bilirubin  0.20 mg/dL    0.2-1.0  

 

 Alkaline Phosphatase  66 U/L      

 

 Alt  37 U/L    7-52  

 

 Ast  29 U/L    13-39  

 

 Egfr Non-  84.1    &gt;60  

 

 Egfr   108.2    &gt;60  14









 Laboratory test finding  2015  Ferritin  82.3 ng/mL      

 

 Vitamin B12 And Folate Serum  2015  Vitamin B12  564 pg/mL    180-914  15









 Folate  15.37 ng/mL    &gt;3.99  









 Laboratory test  2015  TSH (Thyroid  1.92 IU/mL    0.34-5.60  



 finding    Stimulating Horm)        

 

 Laboratory test  2015  C Reactive Protein  &lt; 1.00 mg/L    &lt; 5.00  
16



 finding            









 Erythrocyte Sed Rate  14 mm/Hr    0-30  

 

 Uric Acid  4.3 mg/dL    2.3-6.6  









 Comp Metabolic Panel  10/13/2014  Sodium  139 mmol/L    133-145  









 Potassium  4.5 mmol/L    3.7-5.6  

 

 Chloride  105 mmol/L    101-111  

 

 Co2 Carbon Dioxide  31 mmol/L    22-32  

 

 Anion Gap  3 mmol/L    2-11  

 

 Glucose  83 mg/dL      

 

 Blood Urea Nitrogen  18 mg/dL    6-24  

 

 Creatinine  0.70 mg/dL    0.51-0.95  

 

 BUN/Creatinine Ratio  25.7  High  8-20  

 

 Calcium  9.3 mg/dL    8.6-10.3  

 

 Total Protein  6.5 g/dL    6.4-8.9  

 

 Albumin  4.0 g/dL    3.2-5.2  

 

 Globulin  2.5 g/dL    2-4  

 

 Albumin/Globulin Ratio  1.6    1-3  

 

 Total Bilirubin  0.40 mg/dL    0.2-1.0  

 

 Alkaline Phosphatase  56 U/L      

 

 Alt  17 U/L    7-52  

 

 Ast  16 U/L    13-39  

 

 Egfr Non-  87.2    &gt;60  

 

 Egfr   112.1    &gt;60  17









 Laboratory test finding  10/13/2014  C Reactive Protein  &lt; 1.00 mg/L    &lt
; 5.00  18









 Erythrocyte Sed Rate  18 mm/Hr    0-30  









 Lipid Profile (Trig/Chol/HDL)  10/13/2014  Triglycerides  43 mg/dL      19









 Cholesterol  202 mg/dL      20

 

 HDL Cholesterol  61.4 mg/dL      21

 

 LDL Cholesterol  132 mg/dL      22









 CBC Auto Diff  10/13/2014  White Blood Count  5.6 10^3/uL    4.8-10.8  









 Red Blood Count  4.60 10^6/uL    4.0-5.4  

 

 Hemoglobin  13.8 g/dL    12.0-16.0  

 

 Hematocrit  41 %    35-47  

 

 Mean Corpuscular Volume  90 fL    80-97  

 

 Mean Corpuscular Hemoglobin  30 pg    27-31  

 

 Mean Corpuscular HGB Conc  33 g/dL    31-36  

 

 Red Cell Distribution Width  13 %    10.5-15  

 

 Platelet Count  146 10^3/uL  Low  150-450  

 

 Mean Platelet Volume  11 um3  High  7.4-10.4  

 

 Abs Neutrophils  3.7 10^3/uL    1.5-7.7  

 

 Abs Lymphocytes  1.2 10^3/uL    1.0-4.8  

 

 Abs Monocytes  0.6 10^3/uL    0-0.8  

 

 Abs Eosinophils  0.1 10^3/uL    0-0.6  

 

 Abs Basophils  0 10^3/uL    0-0.2  

 

 Abs Nucleated RBC  0 10^3/uL      

 

 Granulocyte %  65.9 %    38-83  

 

 Lymphocyte %  21.7 %  Low  25-47  

 

 Monocyte %  9.9 %  High  1-9  

 

 Eosinophil %  1.7 %    0-6  

 

 Basophil %  0.8 %    0-2  

 

 Nucleated Red Blood Cells %  0      









 CBC Auto Diff  2014  White Blood Count  6.5 10^3/uL    4.8-10.8  









 Red Blood Count  4.38 10^6/uL    4.0-5.4  

 

 Hemoglobin  13.5 g/dL    12.0-16.0  

 

 Hematocrit  39 %    35-47  

 

 Mean Corpuscular Volume  89 fL    80-97  

 

 Mean Corpuscular Hemoglobin  31 pg    27-31  

 

 Mean Corpuscular HGB Conc  35 g/dL    31-36  

 

 Red Cell Distribution Width  13 %    10.5-15  

 

 Platelet Count  131 10^3/uL  Low  150-450  

 

 Mean Platelet Volume  12 um3  High  7.4-10.4  

 

 Abs Neutrophils  4.2 10^3/uL    1.5-7.7  

 

 Abs Lymphocytes  1.5 10^3/uL    1.0-4.8  

 

 Abs Monocytes  0.7 10^3/uL    0-0.8  

 

 Abs Eosinophils  0.1 10^3/uL    0-0.6  

 

 Abs Basophils  0 10^3/uL    0-0.2  

 

 Abs Nucleated RBC  0 10^3/uL      

 

 Granulocyte %  64.6 %    38-83  

 

 Lymphocyte %  23.1 %  Low  25-47  

 

 Monocyte %  10.5 %  High  1-9  

 

 Eosinophil %  1.2 %    0-6  

 

 Basophil %  0.6 %    0-2  

 

 Nucleated Red Blood Cells %  0      









 Laboratory test finding  2014  C Reactive Protein  &lt; 1.00 mg/L    &lt
; 5.00  23









 Erythrocyte Sed Rate  20 mm/Hr    0-30  









 Comp Metabolic Panel  2014  Sodium  137 mmol/L    133-145  









 Potassium  4.7 mmol/L    3.7-5.6  

 

 Chloride  103 mmol/L    101-111  

 

 Co2 Carbon Dioxide  31 mmol/L    22-32  

 

 Anion Gap  3 mmol/L    2-11  

 

 Glucose  104 mg/dL  High    

 

 Blood Urea Nitrogen  19 mg/dL    6-24  

 

 Creatinine  0.93 mg/dL    0.51-0.95  

 

 BUN/Creatinine Ratio  20.4  High  8-20  

 

 Calcium  9.1 mg/dL    8.6-10.3  

 

 Total Protein  6.5 g/dL    6.4-8.9  

 

 Albumin  4.2 g/dL    3.2-5.2  

 

 Globulin  2.3 g/dL    2-4  

 

 Albumin/Globulin Ratio  1.8    1-3  

 

 Total Bilirubin  0.20 mg/dL    0.2-1.0  

 

 Alkaline Phosphatase  58 U/L      

 

 Alt  18 U/L    7-52  

 

 Ast  19 U/L    13-39  

 

 Egfr Non-  62.8    &gt;60  

 

 Egfr   80.8    &gt;60  24









 Immunoglobulins Serum Quant  2014  Immunoglobulin G  1140 mg/dL    767 - 
1590  25









 Immunoglobulin M  89 mg/dL    37 - 286  

 

 Immunoglobulin A  284 mg/dL    61 - 356  









 Protein Electrophoresis  2014  Total Protein(Pep)  6.7 g/dL    6.3 - 7.9
  









 Albumin  3.5 g/dL    3.4-4.7  

 

 Alpha-1 Globulin  0.2 g/dL    0.1-0.3  

 

 Alpha-2 Globulin  0.8 g/dL    0.6-1.0  

 

 Beta Globulin  0.9 g/dL    0.7-1.2  

 

 Gamma Globulin  1.3 g/dL    0.6-1.6  

 

 Albumin/Globulin Ratio  1.09      

 

 Impression  See Comment      26









 Laboratory test finding  2014  [place] (Anti-Nuclear AB)  Negative    
Negative  



     Screen        









 [place] Screen  Negative    Negative  27









 Lipid Profile (Trig/Chol/HDL)  2014  Triglycerides  77 mg/dL      28









 Cholesterol  204 mg/dL      29

 

 HDL Cholesterol  60.3 mg/dL      30

 

 LDL Cholesterol  128 mg/dL      31









 Laboratory test finding  2014  TSH (Thyroid  2.11 IU/mL    0.34-5.60  



     Stimulating Horm)        

 

 Vitamin D, 25 Hydroxy  2014  25-Hydroxy Vitamin D2  &lt;4.0 ng/mL      









 25-Hydroxy Vitamin D3  27 ng/mL      

 

 25-Hydroxy Vitamin D Total  27 ng/mL      32









 Laboratory test finding  2014  Hepatitis C Antibody  Nonreactive    
Nonreactive  33

 

 CBC Auto Diff  2014  White Blood Count  6.8 10^3/uL    4.8-10.8  









 Red Blood Count  4.28 10^6/uL    4.0-5.4  

 

 Hemoglobin  12.8 g/dL    12.0-16.0  

 

 Hematocrit  38 %    35-47  

 

 Mean Corpuscular Volume  89 fL    80-97  

 

 Mean Corpuscular Hemoglobin  30 pg    27-31  

 

 Mean Corpuscular HGB Conc  34 g/dL    31-36  

 

 Red Cell Distribution Width  13 %    10.5-15  

 

 Platelet Count  167 10^3/uL    150-450  34

 

 Mean Platelet Volume  11 um3  High  7.4-10.4  

 

 Abs Neutrophils  4.3 10^3/uL    1.5-7.7  

 

 Abs Lymphocytes  1.6 10^3/uL    1.0-4.8  

 

 Abs Monocytes  0.7 10^3/uL    0-0.8  

 

 Abs Eosinophils  0.2 10^3/uL    0-0.6  

 

 Abs Basophils  0.1 10^3/uL    0-0.2  

 

 Abs Nucleated RBC  0 10^3/uL      

 

 Granulocyte %  63.0 %    38-83  

 

 Lymphocyte %  23.6 %  Low  25-47  

 

 Monocyte %  10.1 %  High  1-9  

 

 Eosinophil %  2.5 %    0-6  

 

 Basophil %  0.8 %    0-2  

 

 Nucleated Red Blood Cells %  0      









 Laboratory test finding  2014  Erythrocyte Sed Rate  14 mm/Hr    0-30  35









 C Reactive Protein  &lt; 0.5 mg/dL    Less than 0.5  









 Comp Metabolic Panel  2014  Sodium  134 mmol/L    133-145  









 Potassium  4.1 mmol/L    3.5-5.0  

 

 Chloride  99 mmol/L  Low  101-111  

 

 Co2 Carbon Dioxide  30.0 mmol/L    22-32  

 

 Anion Gap  5.0 mmol/L    2-11  

 

 Glucose  91 mg/dL      

 

 Blood Urea Nitrogen  19 mg/dL    6-24  

 

 Creatinine  0.60 mg/dL    0.50-1.40  

 

 BUN/Creatinine Ratio  31.7  High  8-20  

 

 Calcium  9.3 mg/dL    8.1-9.9  

 

 Total Protein  6.7 g/dL    6.2-8.1  

 

 Albumin  3.9 g/dL    3.6-5.4  

 

 Globulin  2.8 g/dL    2-4  

 

 Albumin/Globulin Ratio  1.4    1-3  

 

 Total Bilirubin  0.4 mg/dL    0.4-1.5  

 

 Alkaline Phosphatase  65 U/L      

 

 Alt  25 U/L    14-54  

 

 Ast  22 U/L    12-42  

 

 Egfr Non-  104.2    &gt;60  

 

 Egfr   134.0    &gt;60  36









 Laboratory test finding  2013  Rapid Strep A  neg      

 

 CBC Auto Diff  2013  White Blood Count  7.2 10^3/uL    4.8-10.8  









 Red Blood Count  4.27 10^6/uL    4.0-5.4  

 

 Hemoglobin  12.9 g/dL    12.0-16.0  

 

 Hematocrit  39 %    35-47  

 

 Mean Corpuscular Volume  91 fL    80-97  

 

 Mean Corpuscular Hemoglobin  30 pg    27-31  

 

 Mean Corpuscular HGB Conc  33 g/dL    31-36  

 

 Red Cell Distribution Width  13 %    10.5-15  

 

 Platelet Count  195 10^3/uL    150-450  37

 

 Mean Platelet Volume  11 um3  High  7.4-10.4  

 

 Abs Neutrophils  4.6 10^3/uL    1.5-7.7  

 

 Abs Lymphocytes  1.5 10^3/uL    1.0-4.8  

 

 Abs Monocytes  0.9 10^3/uL  High  0-0.8  

 

 Abs Eosinophils  0.2 10^3/uL    0-0.6  

 

 Abs Basophils  0.1 10^3/uL    0-0.2  

 

 Abs Nucleated RBC  0.01 10^3/uL      

 

 Granulocyte %  64.2 %    38-83  

 

 Lymphocyte %  20.4 %  Low  25-47  

 

 Monocyte %  12.1 %  High  1-9  

 

 Eosinophil %  2.5 %    0-6  

 

 Basophil %  0.8 %    0-2  

 

 Nucleated Red Blood Cells %  0.1      









 Comp Metabolic Panel  2013  Sodium  137 mmol/L    133-145  









 Potassium  3.9 mmol/L    3.5-5.0  

 

 Chloride  102 mmol/L    101-111  

 

 Co2 Carbon Dioxide  30.0 mmol/L    22-32  

 

 Anion Gap  5.0 mmol/L    2-11  

 

 Glucose  93 mg/dL      

 

 Blood Urea Nitrogen  15 mg/dL    6-24  

 

 Creatinine  0.80 mg/dL    0.50-1.40  

 

 BUN/Creatinine Ratio  18.8    8-20  

 

 Calcium  9.0 mg/dL    8.1-9.9  

 

 Total Protein  6.2 g/dL    6.2-8.1  

 

 Albumin  3.8 g/dL    3.6-5.4  

 

 Globulin  2.4 g/dL    2-4  

 

 Albumin/Globulin Ratio  1.6    1-3  

 

 Total Bilirubin  0.4 mg/dL    0.4-1.5  

 

 Alkaline Phosphatase  69 U/L      

 

 Alt  27 U/L    14-54  

 

 Ast  23 U/L    12-42  

 

 Egfr Non-  75.0    &gt;60  

 

 Egfr   96.5    &gt;60  38









 Laboratory test finding  2013  TSH (Thyroid Stimulating  0.98 miu/mL    
0.34-5.60  



     Horm)        

 

 Comp Metabolic Panel  2013  Sodium  138 mmol/L    133-145  









 Potassium  4.7 mmol/L    3.5-5.0  

 

 Chloride  101 mmol/L    101-111  

 

 Co2 Carbon Dioxide  31.0 mmol/L    22-32  

 

 Anion Gap  6.0 mmol/L    2-11  

 

 Glucose  93 mg/dL      

 

 Blood Urea Nitrogen  18 mg/dL    6-24  

 

 Creatinine  0.90 mg/dL    0.50-1.40  

 

 BUN/Creatinine Ratio  20.0    8-20  

 

 Calcium  9.6 mg/dL    8.1-9.9  

 

 Total Protein  6.1 g/dL  Low  6.2-8.1  

 

 Albumin  3.9 g/dL    3.6-5.4  

 

 Globulin  2.2 g/dL    2-4  

 

 Albumin/Globulin Ratio  1.8    1-3  

 

 Total Bilirubin  0.4 mg/dL    0.4-1.5  

 

 Alkaline Phosphatase  64 U/L      

 

 Alt  19 U/L    14-54  

 

 Ast  18 U/L    12-42  

 

 Egfr Non-  65.5    &gt;60  

 

 Egfr   84.2    &gt;60  39









 Laboratory test finding  2013  Erythrocyte Sed Rate  17 mm/Hr    0-30  









 C Reactive Protein  &lt; 0.5 mg/dL    Less than 0.5  









 Laboratory test finding  2011  Amylase  42 U/L      40

 

 Laboratory test finding  2011  Lipase  33 U/L    22-51  

 

 Comp Metabolic Panel  2011  Sodium  137 mmol/L    135-145  









 Potassium  4.2 mmol/L    3.5-5.0  

 

 Chloride  104 mmol/L    101-111  

 

 Co2 (Carbon Dioxide)  27.0 mmol/L    22-32  

 

 Anion Gap  6.0 mmol/L    2-11  41

 

 Glucose  91 mg/dL      

 

 BUN  13 mg/dL    6-24  

 

 Creatinine  0.80 mg/dL    0.50-1.40  

 

 One Over Creatinine  1.20      

 

 BUN/Creatinine Ratio  16.3    8-20  

 

 Calcium  9.2 mg/dL    8.1-9.9  

 

 Total Protein  6.4 GM/DL    6.2-8.1  

 

 Albumin  4.0 GM/DL    3.6-5.4  

 

 Globulin  2.4 GM/DL    2-4  

 

 Albumin/Globulin Ratio  1.7    1-3  

 

 Bilirubin Total  0.5 mg/dL    0.4-1.5  42

 

 Alkaline Phosphatase  59 U/L      

 

 Alt (SGPT)  23 U/L    14-54  

 

 Ast (Sgot)  23 U/L    12-42  

 

 eGFR Non-  75.6    &gt; 60  

 

 eGFR   97.3    &gt; 60  43









 CBC With Manual Diff  2011  White Blood Count  6.8 CUMM    4.8-10.8  









 Red Cell Count  4.36 CUMM    4.2-5.4  

 

 Hemoglobin  13.5 g/dL    12.0-16.0  

 

 Hematocrit  39 %    35-47  

 

 Mean Corpuscular Volume  88 um3    79-97  

 

 Mean Corpuscular Hemoglob  31 pg    27-31  

 

 Mean Corpuscular HGB Cone  35 g/dL    32-36  

 

 Redcell Distribution WDTH  13 %    10.5-15  

 

 Platelet Count  138 CUMM  Low  150-450  

 

 Mean Platelet Volume  11.2 um3  High  7.4-10.4  

 

 Polysegmented Neutrophil  74 %    38-83  

 

 Band Neutrophil  4 %    0-8  

 

 Lymphocyte  18 %  Low  25-47  

 

 Monocyte  4 %    0-13  

 

 Absolute Neutrophil Count  5.3      

 

 RBC Morphology  NORMAL      









 Laboratory test  2011  C Reactive Protein  &lt; 0.5 mg/dL    Less Than 
0.5  



 finding            

 

 CBC With Manual Diff  2011  White Blood Count  7.1 CUMM    4.8-10.8  









 Red Cell Count  4.36 CUMM    4.2-5.4  

 

 Hemoglobin  13.4 g/dL    12.0-16.0  

 

 Hematocrit  40 %    35-47  

 

 Mean Corpuscular Volume  91 um3    79-97  

 

 Mean Corpuscular Hemoglob  31 pg    27-31  

 

 Mean Corpuscular HGB Cone  34 g/dL    32-36  

 

 Redcell Distribution WDTH  12 %    10.5-15  

 

 Platelet Count  150 CUMM    150-450  

 

 Mean Platelet Volume  11.9 um3  High  7.4-10.4  

 

 Polysegmented Neutrophil  65 %    38-83  

 

 Lymphocyte  25 %    25-47  

 

 Monocyte  9 %    0-13  

 

 Eosinophil  1 %    0-6  

 

 Absolute Neutrophil Count  4.6      

 

 RBC Morphology  NORMAL      









 Laboratory test finding  2011  Erythrocyte Sed Rate  9 MM/HR    0-30  

 

 Comp Metabolic Panel  2011  Sodium  138 mmol/L    135-145  









 Potassium  4.3 mmol/L    3.5-5.0  

 

 Chloride  102 mmol/L    101-111  

 

 Co2 (Carbon Dioxide)  30.0 mmol/L    22-32  

 

 Anion Gap  6.0 mmol/L    2-11  44

 

 Glucose  84 mg/dL      

 

 BUN  14 mg/dL    6-24  

 

 Creatinine  0.80 mg/dL    0.50-1.40  

 

 One Over Creatinine  1.20      

 

 BUN/Creatinine Ratio  17.5    8-20  

 

 Calcium  9.8 mg/dL    8.1-9.9  

 

 Total Protein  6.1 GM/DL  Low  6.2-8.1  

 

 Albumin  4.1 GM/DL    3.6-5.4  

 

 Globulin  2.0 GM/DL    2-4  

 

 Albumin/Globulin Ratio  2.1    1-3  

 

 Bilirubin Total  0.5 mg/dL    0.4-1.5  45

 

 Alkaline Phosphatase  51 U/L      

 

 Alt (SGPT)  23 U/L    14-54  

 

 Ast (Sgot)  22 U/L    12-42  

 

 eGFR Non-  75.6    &gt; 60  

 

 eGFR   97.3    &gt; 60  46









 1  *******Because ethnic data is not always readily available,



   this report includes an eGFR for both -Americans and



   non- Americans.****



   The National Kidney Disease Education Program (NKDEP) does



   not endorse the use of the MDRD equation for patients that



   are not between the ages of 18 and 70, are pregnant, have



   extremes of body size, muscle mass, or nutritional status,



   or are non- or non-.



   According to the [employer], irrespective of



   diagnosis, the stage of the disease is based on the level of



   kidney function:



   Stage Description                      GFR(mL/min/1.73 m(2))



   1     Kidney damage with normal or decreased GFR       90



   2     Kidney damage with mild decrease in GFR          60-89



   3     Moderate decrease in GFR                         30-59



   4     Severe decrease in GFR                           15-29



   5     Kidney failure                       &lt;15 (or dialysis)

 

 2  SEE RESULT BELOW



   -----------------------------------------------------------------------------
---------------



   Name:  ADARSH FAIRCHILD                     : 1960    Attend Dr: Paco Alcocer NP



   Acct:  [id number]  Unit: [id number]  AGE: 57            Location:  [facility]



   Re18                        SEX: F             Status:    REG REF



   -----------------------------------------------------------------------------
---------------



   



   SPEC: [id number]         ANTONIO:   18-    SUBM DR: Paco Alcocer NP



   REQ:  [id number]              RECD:   



   STATUS: COMP



   _



   SOURCE: URINE          SPDESC:



   ORDERED:  Urine Culture



   



   -----------------------------------------------------------------------------
---------------



   Procedure                         Result                         Reported   
        Site



   -----------------------------------------------------------------------------
---------------



   Urine Culture  Final                                             18-
1212      ML



   No Growth (&lt;1,000 CFU/mL)



   



   -----------------------------------------------------------------------------
---------------



   * ML - MAIN LAB ([facility])



   .



   



   



   



   



   



   



   



   



   



   



   



   



   



   



   



   



   



   



   



   



   



   



   



   



   



   



   ** END OF REPORT **



   



   * ML=Testing performed at Main Lab



   DEPARTMENT OF PATHOLOGY,  [address] [place] [address]



   Phone # [phone]      Fax #[phone]



   Casimiro Hall M.D. Director     [facility] # [id number]

 

 3  Desirable &lt;150



   Borderline high 150-199



   High 200-499



   Very High &gt;500

 

 4  Desirable &lt;200



   Borderline high 200-239



   High &gt;239

 

 5  Low &lt;40



   Desirable: 40-60



   High: &gt;60

 

 6  Desirable: &lt;100 mg/dL



   Near Optimal: 100-129 mg/dL



   Borderline High: 130-159 mg/dL



   High: 160-189 mg/dL



   Very High: &gt;189 mg/dL

 

 7  *******Because ethnic data is not always readily available,



   this report includes an eGFR for both -Americans and



   non- Americans.****



   The National Kidney Disease Education Program (NKDEP) does



   not endorse the use of the MDRD equation for patients that



   are not between the ages of 18 and 70, are pregnant, have



   extremes of body size, muscle mass, or nutritional status,



   or are non- or non-.



   According to the National Kidney Foundation, irrespective of



   diagnosis, the stage of the disease is based on the level of



   kidney function:



   Stage Description                      GFR(mL/min/1.73 m(2))



   1     Kidney damage with normal or decreased GFR       90



   2     Kidney damage with mild decrease in GFR          60-89



   3     Moderate decrease in GFR                         30-59



   4     Severe decrease in GFR                           15-29



   5     Kidney failure                       &lt;15 (or dialysis)

 

 8  Acute inflammation:  &gt;10.00

 

 9  *******Because ethnic data is not always readily available,



   this report includes an eGFR for both -Americans and



   non- Americans.****



   The National Kidney Disease Education Program (NKDEP) does



   not endorse the use of the MDRD equation for patients that



   are not between the ages of 18 and 70, are pregnant, have



   extremes of body size, muscle mass, or nutritional status,



   or are non- or non-.



   According to the National Kidney Foundation, irrespective of



   diagnosis, the stage of the disease is based on the level of



   kidney function:



   Stage Description                      GFR(mL/min/1.73 m(2))



   1     Kidney damage with normal or decreased GFR       90



   2     Kidney damage with mild decrease in GFR          60-89



   3     Moderate decrease in GFR                         30-59



   4     Severe decrease in GFR                           15-29



   5     Kidney failure                       &lt;15 (or dialysis)

 

 10  Acute inflammation:  &gt;10.00

 

 11  *******Because ethnic data is not always readily available,



   this report includes an eGFR for both -Americans and



   non- Americans.****



   The National Kidney Disease Education Program (NKDEP) does



   not endorse the use of the MDRD equation for patients that



   are not between the ages of 18 and 70, are pregnant, have



   extremes of body size, muscle mass, or nutritional status,



   or are non- or non-.



   According to the National Kidney Foundation, irrespective of



   diagnosis, the stage of the disease is based on the level of



   kidney function:



   Stage Description                      GFR(mL/min/1.73 m(2))



   1     Kidney damage with normal or decreased GFR       90



   2     Kidney damage with mild decrease in GFR          60-89



   3     Moderate decrease in GFR                         30-59



   4     Severe decrease in GFR                           15-29



   5     Kidney failure                       &lt;15 (or dialysis)

 

 12  Acute inflammation:  &gt;10.00

 

 13  RUN DATE: 05/02/15               [facility] **LIVE**       
         PAGE    1



   RUN TIME: 811             [address] [place] [address]   [id number]



   Specimen Inquiry



   



   -----------------------------------------------------------------------------
---------------



   Name:  ADARSH FAIRCHILD                     : 1960    Attend Dr: Paco Alcocer NP



   Acct:  [id number]  Unit: [id number]  AGE: 54            Location:  [facility]



   Re/30/15                        SEX: F             Status:    REG REF



   -----------------------------------------------------------------------------
---------------



   



   SPEC: [id number]         ANTONIO:   04/30/    VAMSHI DR: Paco Alcocer NP



   REQ:  [id number]              RECD:   04/30/



   STATUS: COMP



   _



   SOURCE: THROAT         SPDESC:



   ORDERED:  Throat Culture



   QUERIES:  Provider Requisition # [id number]



   



   -----------------------------------------------------------------------------
---------------



   Procedure                         Result                         Verified   
        Site



   -----------------------------------------------------------------------------
---------------



   Throat Culture  Final                                            05/02/15-
811      ML



   



   Organism 1                     NORMAL MICHELLE



   Quantity                    3+



   



   -----------------------------------------------------------------------------
---------------



   * ML - MAIN LAB ([facility])



   .



   



   



   



   



   



   



   



   



   



   



   



   



   



   



   



   



   



   



   



   



   



   



   



   



   ** END OF REPORT **



   



   * ML=Testing performed at Main Lab



   DEPARTMENT OF PATHOLOGY,  [address] [place] [address]



   Phone # [phone]      Fax #[phone]



   Casimiro Hall M.D. Director     [facility] # [id number]

 

 14  *******Because ethnic data is not always readily available,



   this report includes an eGFR for both -Americans and



   non- Americans.****



   The National Kidney Disease Education Program (NKDEP) does



   not endorse the use of the MDRD equation for patients that



   are not between the ages of 18 and 70, are pregnant, have



   extremes of body size, muscle mass, or nutritional status,



   or are non- or non-.



   According to the National Kidney Foundation, irrespective of



   diagnosis, the stage of the disease is based on the level of



   kidney function:



   Stage Description                      GFR(mL/min/1.73 m(2))



   1     Kidney damage with normal or decreased GFR       90



   2     Kidney damage with mild decrease in GFR          60-89



   3     Moderate decrease in GFR                         30-59



   4     Severe decrease in GFR                           15-29



   5     Kidney failure                       &lt;15 (or dialysis)

 

 15  Normal Range 180 to 914



   Indeterminate Range 145 to 180



   Deficient Range  &lt;145

 

 16  Acute inflammation:  &gt;10.00

 

 17  *******Because ethnic data is not always readily available,



   this report includes an eGFR for both -Americans and



   non- Americans.****



   The National Kidney Disease Education Program (NKDEP) does



   not endorse the use of the MDRD equation for patients that



   are not between the ages of 18 and 70, are pregnant, have



   extremes of body size, muscle mass, or nutritional status,



   or are non- or non-.



   According to the National Kidney Foundation, irrespective of



   diagnosis, the stage of the disease is based on the level of



   kidney function:



   Stage Description                      GFR(mL/min/1.73 m(2))



   1     Kidney damage with normal or decreased GFR       90



   2     Kidney damage with mild decrease in GFR          60-89



   3     Moderate decrease in GFR                         30-59



   4     Severe decrease in GFR                           15-29



   5     Kidney failure                       &lt;15 (or dialysis)

 

 18  Acute inflammation:  &gt;10.00

 

 19  Desirable &lt;150



   Borderline high 150-199



   High 200-499



   Very High &gt;500

 

 20  Desirable &lt;200



   Borderline high 200-239



   High &gt;239

 

 21  Low &lt;40



   Desirable: 40-60



   High: &gt;60

 

 22  Desirable &lt;100



   Near Optimal 100-129



   Borderline high 130-159



   High 160-189



   Very High &gt;189

 

 23  Acute inflammation:  &gt;10.00

 

 24  *******Because ethnic data is not always readily available,



   this report includes an eGFR for both -Americans and



   non- Americans.****



   The National Kidney Disease Education Program (NKDEP) does



   not endorse the use of the MDRD equation for patients that



   are not between the ages of 18 and 70, are pregnant, have



   extremes of body size, muscle mass, or nutritional status,



   or are non- or non-.



   According to the National Kidney Foundation, irrespective of



   diagnosis, the stage of the disease is based on the level of



   kidney function:



   Stage Description                      GFR(mL/min/1.73 m(2))



   1     Kidney damage with normal or decreased GFR       90



   2     Kidney damage with mild decrease in GFR          60-89



   3     Moderate decrease in GFR                         30-59



   4     Severe decrease in GFR                           15-29



   5     Kidney failure                       &lt;15 (or dialysis)

 

 25  Test Performed by:



   [place] [address]



   : Matheus Harman III, M.D.

 

 26  RESULT: No apparent monoclonal protein on serum electrophoresis.



   Test Performed by:



   [place] [address]



   : Matheus Harman III, M.D.

 

 27  The above TERRI screen is designed for the detection of



   antibodies to extractable nuclear antigen (TERRI) in human



   serum. It is a combination test for the detection of



   antibodies to RNP, Sm, SS-A (Ro), and SS-B (La)



   nuclear antigens.

 

 28  Desirable &lt;150



   Borderline high 150-199



   High 200-499



   Very High &gt;500

 

 29  Desirable &lt;200



   Borderline high 200-239



   High &gt;239

 

 30  Low &lt;40



   Desirable: 40-60



   High: &gt;60

 

 31  Desirable &lt;100



   Near Optimal 100-129



   Borderline high 130-159



   High 160-189



   Very High &gt;189

 

 32  -- REFERENCE VALUE --



   25-HYDROXY D TOTAL (D2+D3) Optimum levels in the healthy



   population are 20-50, patients with bone disease may



   benefit from higher levels within this range.



   Test Performed by:



   [address] [address]



   : Matheus Harman III, M.D.

 

 33  NON-FASTING

 

 34  CONFIRMED BY ESTIMATE ON BLOOD SMEAR

 

 35  @14 1851: Slide Review added. RFLXG=SR.

 

 36  *******Because ethnic data is not always readily available,



   this report includes an eGFR for both -Americans and



   non- Americans.****



   The National Kidney Disease Education Program (NKDEP) does



   not endorse the use of the MDRD equation for patients that



   are not between the ages of 18 and 70, are pregnant, have



   extremes of body size, muscle mass, or nutritional status,



   or are non- or non-.



   According to the National Kidney Foundation, irrespective of



   diagnosis, the stage of the disease is based on the level of



   kidney function:



   Stage Description                      GFR(mL/min/1.73 m(2))



   1     Kidney damage with normal or decreased GFR       90



   2     Kidney damage with mild decrease in GFR          60-89



   3     Moderate decrease in GFR                         30-59



   4     Severe decrease in GFR                           15-29



   5     Kidney failure                       &lt;15 (or dialysis)

 

 37  Platelet count confirmed by smear estimate.

 

 38  *******Because ethnic data is not always readily available,



   this report includes an eGFR for both -Americans and



   non- Americans.****



   The National Kidney Disease Education Program (NKDEP) does



   not endorse the use of the MDRD equation for patients that



   are not between the ages of 18 and 70, are pregnant, have



   extremes of body size, muscle mass, or nutritional status,



   or are non- or non-.



   According to the National Kidney Foundation, irrespective of



   diagnosis, the stage of the disease is based on the level of



   kidney function:



   Stage Description                      GFR(mL/min/1.73 m(2))



   1     Kidney damage with normal or decreased GFR       90



   2     Kidney damage with mild decrease in GFR          60-89



   3     Moderate decrease in GFR                         30-59



   4     Severe decrease in GFR                           15-29



   5     Kidney failure                       &lt;15 (or dialysis)

 

 39  *******Because ethnic data is not always readily available,



   this report includes an eGFR for both -Americans and



   non- Americans.****



   The National Kidney Disease Education Program (NKDEP) does



   not endorse the use of the MDRD equation for patients that



   are not between the ages of 18 and 70, are pregnant, have



   extremes of body size, muscle mass, or nutritional status,



   or are non- or non-.



   According to the National Kidney Foundation, irrespective of



   diagnosis, the stage of the disease is based on the level of



   kidney function:



   Stage Description                      GFR(mL/min/1.73 m(2))



   1     Kidney damage with normal or decreased GFR       90



   2     Kidney damage with mild decrease in GFR          60-89



   3     Moderate decrease in GFR                         30-59



   4     Severe decrease in GFR                           15-29



   5     Kidney failure                       &lt;15 (or dialysis)

 

 40  PLEASE NOTE NEW REFERENCE RANGE.

 

 41  Anion gap measurement may be of limited value in the



   presence of any alkalosis, especially in a combined acid



   base disorder.



   .

 

 42  A metabolite of Naproxen, O-desmethylnaproxen, has been



   shown to interfere with the Jendrassik-Cristopher method for



   measuring total bilirubin.  Samples from patients who have



   taken Naproxen have shown spurious elevation in total



   bilirubin levels.

 

 43  *******Because ethnic data is not always readily available,



   this report includes an eGFR for both -Americans and



   non- Americans.****



   The National Kidney Disease Education Program (NKDEP) does



   not endorse the use of the MDRD equation for patients that



   are not between the ages of 18 and 70, are pregnant, have



   extremes of body size, muscle mass, or nutritional status,



   or are non- or non-.



   According to the National Kidney Foundation, irrespective of



   diagnosis, the stage of the disease is based on the level of



   kidney function:



   Stage Description                      GFR(mL/min/1.73 m(2))



   1     Kidney damage with normal or decreased GFR       90



   2     Kidney damage with mild decrease in GFR          60-89



   3     Moderate decrease in GFR                         30-59



   4     Severe decrease in GFR                           15-29



   5     Kidney failure                       &lt;15 (or dialysis)

 

 44  Anion gap measurement may be of limited value in the



   presence of any alkalosis, especially in a combined acid



   base disorder.



   .

 

 45  A metabolite of Naproxen, O-desmethylnaproxen, has been



   shown to interfere with the Jendrassik-Canaan method for



   measuring total bilirubin.  Samples from patients who have



   taken Naproxen have shown spurious elevation in total



   bilirubin levels.

 

 46  *******Because ethnic data is not always readily available,



   this report includes an eGFR for both -Americans and



   non- Americans.****



   The National Kidney Disease Education Program (NKDEP) does



   not endorse the use of the MDRD equation for patients that



   are not between the ages of 18 and 70, are pregnant, have



   extremes of body size, muscle mass, or nutritional status,



   or are non- or non-.



   According to the National Kidney Foundation, irrespective of



   diagnosis, the stage of the disease is based on the level of



   kidney function:



   Stage Description                      GFR(mL/min/1.73 m(2))



   1     Kidney damage with normal or decreased GFR       90



   2     Kidney damage with mild decrease in GFR          60-89



   3     Moderate decrease in GFR                         30-59



   4     Severe decrease in GFR                           15-29



   5     Kidney failure                       &lt;15 (or dialysis)







Procedures







 Date  CPT Code  Description  Status

 

 2018    Mammogram  Completed

 

 2018    Bone Mineral Density Test  Completed

 

 2018  75216  EKG Tracing &amp; Interpretation  Completed

 

 2017  59251  Inject/Drain Joint/Bursa Major  Completed

 

 2017  84331  Inject/Drain Joint/Bursa Major  Completed

 

 2017  23486  Inject/Drain Joint/Bursa Major  Completed

 

 2017  11474  Inject/Drain Joint/Bursa Major  Completed

 

 2014  15433  Destruction Of Benign Lesions Any Method 1-14 lesions  
Completed

 

 2014  81469  EKG Tracing &amp; Interpretation  Completed

 

 2014  40846  Destruction Of Benign Lesions Any Method 1-14 lesions  
Completed

 

 2010    Colonoscopy  Completed

 

 2010  13955  Destruction ALL Benign Or Premalignant Lesion (Other  
Completed



     Than Skintag  

 

 2010  27055  Dest Lesion Each Addl Lesion 2 Through 14 Each  Completed

 

 2006    Mammogram  Completed

 

 2003  39269  Pulse Doppler &amp; Continuous Wave  Completed

 

 2003  36002  Echocardiogram  Completed

 

 2003  41241  Color Doppler  Completed







Encounters







 Type  Date  Location  Provider  CPT E/M  Dx

 

 Office Visit  2017  Orthopedic Services  Laure Jeter M.D.  [address]  
M25.561



   10:45a  Of [facility]      









 M25.461

 

 M17.31









 Office Visit  2017  3:00p  [facility] Of  DARCI Bardales  
[address]  M45.7



     [facility]      









 M25.561

 

 Z79.899









 Office Visit  2017  4:00p  [facility]  Mali Paco,  
[address]  Z00.00



     - Christal LEARY    









 E04.9









 Office Visit  2017 10:00a  [facility]  Laure Jeter M.D.  
31639  M25.561



     C.M.AMimi      









 M25.461

 

 M17.31









 Office Visit  2017 11:40a  [facility]  Mali Paco,  
[address]  I83.12



     - Christal LEARY    









 R03.0

 

 E78.5









 Office Visit  2017  2:00p  [facility] Of  Oswald Perez ELIAS  
[address]81  M45.7



     [facility]      









 R76.8

 

 M48.07

 

 Z79.899

 

 R21









 Office Visit  2016  4:00p  [facility]  Radha Jackson,  [address]  
J45.901



     - Christal  N.P.    

 

 Office Visit  2016  3:30p  [facility]ology Services  Oswald Perez ELIAS  
[address]  M45.7



     Of [facility]      









 R76.8

 

 M75.82









 Office Visit  2016  4:00p  [facility]  Mali Antonio,  
[address]  Z00.00



     - Christal LEARY    









 Z12.11









 Office Visit  10/13/2015  3:00p  [facility] Of  Oswald PerezDARCI  
[address]  M45.7



     [facility]      









 M48.06

 

 M75.82

 

 Z79.899









 Office Visit  07/10/2015 10:00a  [facility] Services  DARCI Bardales  
[address]  724.02



     Of [facility]      









 720.0

 

 726.19

 

 995.3









 Office Visit  2015  4:00p  [facility] -  Paco Alcocer NP  
[address]  472.1



     [place]      









 780.79









 Office Visit  2015  2:40p  [facility] Services  Honorio Shah  [address]  
724.02



     Of Gurmeet LEARY    









 720.0

 

 726.19

 

 338.4









 Office Visit  2015  2:00p  [facility]  Paco Alcocer NP  [address]  
461.8



     - [place]      

 

 Office Visit  2014  3:00p  [facility]  Radha Jackson, N.P.  
41159  078.10



     - [place]      









 110.1









 Office Visit  2014  2:00p  [facility]  Honorio Shah,  23797  
724.02



     Of [facility]  M.D.    









 720.0

 

 726.19

 

 995.3









 Office Visit  10/07/2014  2:40p  [facility]  Honorio Shah,  16112  
724.02



     Of [facility]  M.D.    









 720.0

 

 726.19

 

 995.3









 Office Visit  2014 11:40a  [facility]  Mali Paco,  
[address]892  401.1



     - [place]    









 078.12









 Office Visit  2014 10:40a  [facility]  Honorio Shah,  23546  
724.02



     Of [facility]  M.D.    









 720.0

 

 726.19

 

 272.4

 

 721.0









 Office Visit  2014  2:40p  [facility]  Honorio Shah,  33905  
724.02



     Of [facility]  M.D.    









 720.0

 

 683

 

 726.19









 Office Visit  2014  2:20p  [facility]  Honorio Shah M.D.  
64515  720.0



     Of [facility]      









 724.02

 

 V58.69

 

 683

 

 726.19









 Office Visit  2014 11:40a  [facility] -  Leonor Gonzales M.D.,  
[address]3222  V70.0



     [place]  FACP    









 V76.10

 

 V73.89

 

 726.19

 

 078.12

 

 401.1

 

 272.0

 

 268.9

 

 V06.1









 Office Visit  2014  2:40p  [facility]  Honorio Shah,  45186  
724.02



     Of [facility]  M.D.    









 720.0

 

 V58.69

 

 840.4

 

 472.1

 

 726.19









 Office Visit  2013  3:00p  [facility] -  Marcela Bonilla M.D.  
88828  784.1



     [place]      

 

 Office Visit  2013  2:40p  [facility]  Honorio Shah  68191  
724.02



     Of [facility]  M.D.    









 720.0

 

 V58.69









 Office Visit  2013  4:20p  [facility]  Honorio Shah M.D.  
50383  720.0



     Of [facility]      









 724.02









 Office Visit  2012  3:40p  [facility] -  Leonor Gonzales M.D.,  
51710  726.19



     [place]  FACP    

 

 Office Visit  2012  3:40p  [facility] -  Leonor Gonzales M.D.,  
04325  726.19



     [place]  FACP    

 

 Office Visit  2012  9:15a  [facility]  Balaji Benson M.D.  
41323  840.9



     C.[facility]A.      

 

 Office Visit  2011  3:00p  DO Not Use  Leonor Gonzales M.D.,  07683  726.19



     [facility]  FACP    

 

 Office Visit  10/10/2011 11:20a  DO Not Use  Leonor Gonzales M.D.,  87105  728.85



     [facility]  FACP    

 

 Office Visit  2011  4:00p  DO Not Use  Leonor Gonzales M.D.,  18638  729.5



     [facility]  FACP    

 

 Office Visit  2011  2:00p  DO Not Use  Radha Jackson,  54309  789.02



     [facility]  N.P.    

 

 Office Visit  2011  4:20p  [facility]  Honorio Shah,  24942  
287.5



     Of Gurmeet LEARY    









 720.0

 

 721.3









 Office Visit  2011  3:30p  DO Not Use [facility]  Radha Varn,  
24644  311



       N.P.    

 

 Office Visit  2010  1:15p  DO Not Use [facility]  Radha Varn,  
16246  401.1



       N.P.    









 311

 

 461.9









 Office Visit  2010  1:30p  DO Not Use  Radha Varn,  58121  V70.0



     [facility]  N.P.    

 

 Office Visit  2010  3:45p  DO Not Use  Radha Varn,  [id number]  [phone]



     [facility]  [facility]    









 729.5









 Office Visit  2010  4:00p  DO Not Use [facility]  Radha Ramonn,  
[address]  455.6



       N.P.    

 

 Office Visit  2010  3:00p  DO Not Use [facility]  Leonor Gonzales M.D.,  
[address]  528.2



       FACP    

 

 Office Visit  2009  4:00p  DO Not Use [facility]  Nova Razo,  
[address]  461.9



       M.D.    









 462









 Office Visit  2008  2:45p  DO Not Use [facility]  Leonor Gonzales,  [address]
  721.90



       M.D., FACP    

 

 Office Visit  10/03/2008  3:30p  DO Not Use [facility]  Leonor Gonzales,  [address]
  724.2



       M.D., FACP    









 333.1









 Office Visit  10/25/2007 11:45a  DO Not Use  Radha Renetta,  [address]  [phone]



     [facility]    









 558.9







Plan of Care

Future Appointment(s):2018 10:15 am - Laure Jeter M.D. at [facility].03/15/2018 10:30 am - Cecil Gentile PA-C [facility]/15/2018 10:30 am - ANDRE Armstrong at [facility].03/15/2018 10:30 am - Laure Jeter M.D. at [facility].2018 - Laure Jeter M.D.M25.561 Pain in right 
kneeFollow up:Follow up:   2 weeks after pxdhabeX88.31 Unilateral post-
traumatic osteoarthritis, right kneeM25.461 Effusion, right knee

## 2018-03-15 NOTE — RAD
Indication: Immediate postop exam following RIGHT total knee replacement.



Comparison: March 02, 2018



Technique: Portable AP and cross table lateral views RIGHT knee.



Report: Status post total knee replacement. Anterior surgical drain in place. Previous

surgical anchors from ACL reconstruction remaining at the femur and tibia. Post-op fluid

and gas is seen in the joint space and anterior subcutaneous tissues. Alignment is

anatomic. No periprosthetic fracture evident.



IMPRESSION: Unremarkable immediate postoperative appearance following RIGHT knee

replacement.

## 2018-03-16 LAB
HCT VFR BLD AUTO: 29 % (ref 35–47)
HGB BLD-MCNC: 9.6 G/DL (ref 12–16)
INR PPP/BLD: 1 (ref 0.77–1.02)
PLATELET # BLD AUTO: 112 10^3/UL (ref 150–450)

## 2018-03-16 RX ADMIN — OXYCODONE HYDROCHLORIDE AND ACETAMINOPHEN PRN TAB: 5; 325 TABLET ORAL at 11:52

## 2018-03-16 RX ADMIN — OXYCODONE HYDROCHLORIDE PRN MG: 5 CAPSULE ORAL at 21:33

## 2018-03-16 RX ADMIN — MAGNESIUM HYDROXIDE SCH ML: 400 SUSPENSION ORAL at 08:36

## 2018-03-16 RX ADMIN — OXYCODONE HYDROCHLORIDE PRN MG: 5 CAPSULE ORAL at 14:24

## 2018-03-16 RX ADMIN — OXYCODONE HYDROCHLORIDE PRN MG: 5 CAPSULE ORAL at 01:54

## 2018-03-16 RX ADMIN — ONDANSETRON PRN MG: 2 INJECTION INTRAMUSCULAR; INTRAVENOUS at 19:23

## 2018-03-16 RX ADMIN — CLONAZEPAM SCH MG: 0.5 TABLET ORAL at 17:28

## 2018-03-16 RX ADMIN — CEFAZOLIN SODIUM SCH MLS/HR: 1 SOLUTION INTRAVENOUS at 01:49

## 2018-03-16 RX ADMIN — OXYCODONE HYDROCHLORIDE AND ACETAMINOPHEN PRN TAB: 5; 325 TABLET ORAL at 06:20

## 2018-03-16 RX ADMIN — PROPRANOLOL HYDROCHLORIDE SCH MG: 20 TABLET ORAL at 21:32

## 2018-03-16 RX ADMIN — OXYCODONE HYDROCHLORIDE PRN MG: 5 CAPSULE ORAL at 08:35

## 2018-03-16 RX ADMIN — VENLAFAXINE HYDROCHLORIDE SCH MG: 75 CAPSULE, EXTENDED RELEASE ORAL at 21:32

## 2018-03-16 RX ADMIN — OXYBUTYNIN CHLORIDE SCH MG: 5 TABLET ORAL at 21:32

## 2018-03-16 RX ADMIN — DOCUSATE SODIUM SCH MG: 100 CAPSULE, LIQUID FILLED ORAL at 21:32

## 2018-03-16 RX ADMIN — DOCUSATE SODIUM SCH MG: 100 CAPSULE, LIQUID FILLED ORAL at 08:35

## 2018-03-16 RX ADMIN — CLONAZEPAM SCH MG: 0.5 TABLET ORAL at 21:34

## 2018-03-16 RX ADMIN — CLONAZEPAM SCH MG: 0.5 TABLET ORAL at 08:35

## 2018-03-16 RX ADMIN — VENLAFAXINE HYDROCHLORIDE SCH MG: 75 CAPSULE, EXTENDED RELEASE ORAL at 08:35

## 2018-03-16 RX ADMIN — MAGNESIUM HYDROXIDE SCH ML: 400 SUSPENSION ORAL at 21:32

## 2018-03-16 RX ADMIN — PROPRANOLOL HYDROCHLORIDE SCH MG: 20 TABLET ORAL at 08:36

## 2018-03-16 RX ADMIN — CEFAZOLIN SODIUM SCH MLS/HR: 1 SOLUTION INTRAVENOUS at 08:36

## 2018-03-16 RX ADMIN — OXYCODONE HYDROCHLORIDE AND ACETAMINOPHEN PRN TAB: 5; 325 TABLET ORAL at 17:28

## 2018-03-16 RX ADMIN — TRAZODONE HYDROCHLORIDE SCH MG: 50 TABLET ORAL at 21:53

## 2018-03-16 NOTE — PRO
CC:  Dr. Antonio

 

PROCEDURE REPORT:

 

DATE OF PROCEDURE:  03/15/18

 

PROCEDURE:  EGD.

 

INDICATION:  Esophageal foreign body sensation, emergently performed in the hospital.

 

REFERRING PHYSICIAN:  Dr. Antonio.

 

MEDICATIONS GIVEN:

1.  25 mcg IV fentanyl.

2.  3 mg IV Versed.

 

PROCEDURE IN DETAIL:  After the EGD procedure, including the risks, benefits, and alternatives, not l
imited to perforation, surgery and/or death were explained to Mrs. Fairchild, written consent was then obt
ained.  IV medication was given and a bite- block was placed between the teeth.  An Olympus pediatric
 gastroscope was then inserted into the patient's mouth, advanced down the esophagus, into the stomac
h, and into the proximal duodenum.  In the esophagus at the GE junction, the Z-line was intact.  No e
rosive esophagitis, stricture or ring was seen.  No foreign body was seen.  I did take biopsies for e
osinophilic esophagitis.  The scope was advanced through the GE junction and into the body of the sto
mach.  Retroflex and forward views revealed a large amount of water.  The scope was advanced through 
the pylorus into the duodenal bulb.  The scope was then withdrawn from the patient. She tolerated the
 procedure well and was returned to her hospital room in stable condition.

 

IMPRESSION:

1.  Complete upper endoscopy into the duodenum with biopsies.

2.  No esophageal foreign body, biopsies for eosinophilic esophagitis.

3.  I will follow up on all the biopsies.

 

 [id number]/[facility] #: [id number]

## 2018-03-16 NOTE — PN
Subjective


Date of Service: 03/16/18


Interval History: 





Patient seen and examined at bedside. Denies fever, chills, lightheadedness or 

dizziness, shortness of breath, chest discomfort, N/V/D. Pt states that she is 

having a lot of pain in her left hip. She also continues to have some discomfort

, stating that it still felt like she had food in her esophagus. She underwent 

an emergent EGD last night and no food was found in her esophagus. Per Pt and 

her  she has an episode of food getting stuck about once a month.








Family History: Unchanged from Admission


Social History: Unchanged from Admission


Past Medical History: Unchanged from Admission





Objective


Active Medications: 





Acetaminophen (Tylenol Tab*)  650 mg PO Q4H PRN Reason: PAIN OR TEMPERATURE


Albuterol (Ventolin Hfa Inhaler*)  1 puff INH BID PRN Reason: SOB/WHEEZING


Bisacodyl (Dulcolax Supp*)  10 mg SD DAILY PRN Reason: constipation


Clonazepam (Klonopin Tab(*))  0.25 mg PO 0900,1700 SAIDA


Clonazepam (Klonopin Tab(*))  0.5 mg PO 2100 SAIDA


Cyclobenzaprine HCl (Flexeril Tab*)  10 mg PO TID PRN Reason: SPASMS


Diphenhydramine HCl (Benadryl Iv*)  12.5 mg IV Q6H PRN Reason: PRURITIS


Docusate Sodium (Colace Cap*)  100 mg PO BID SAIDA


Enoxaparin Sodium (Lovenox(*))  30 mg SUBCUT Q24H SAIDA


Lactated Ringer's (Lactated Ringers 1000 Ml Bag*)  1,000 mls @ 100 mls/hr IV 

PER RATE SAIDA


Lactulose (Lactulose*)  30 ml PO Q6H PRN Reason: constipation


Magnesium Hydroxide (Milk Of Magnesia Liq*)  30 ml PO BID SAIDA


Magnesium Hydroxide (Milk Of Magnesia Liq*)  30 ml PO Q6H PRN Reason: 

constipation


Morphine Sulfate (Morphine Inj (Syringe)*)  2 mg IV Q2H PRN Reason: PAIN


Ondansetron HCl (Zofran Inj*)  4 mg IV Q6H PRN Reason: nausea


Ondansetron HCl (Zofran Tab*)  4 mg PO Q6H PRN Reason: NAUSEA


Oxybutynin Chloride (Ditropan Tab*)  5 mg PO BEDTIME SAIDA


Oxycodone HCl (Roxycodone Tab*)  10 mg PO Q4H PRN Reason: SEVERE PAIN


Oxycodone/Acetaminophen (Percocet 5/325 Tab*)  2 tab PO Q4H PRN Reason: PAIN


Oxycodone/Acetaminophen (Percocet 5/325 Tab*)  1 tab PO Q4H PRN Reason: PAIN


Pharmacy Profile Note (Coumadin Daily Reminder*)  1 note FOLLOW UP 1700 [facility]


Polyethylene Glycol/Electrolytes (Miralax*)  17 gm PO DAILY PRN Reason: 

Constipation


Propranolol HCl (Inderal Tab*)  20 mg PO BID SAIDA


Trazodone HCl (Desyrel Tab*)  50 mg PO BEDTIME SAIDA


Venlafaxine HCl (Effexor Xr Cap*)  150 mg PO BID SAIDA


Warfarin Sodium (Coumadin Tab(*))  6 mg PO ONCE@1700 [facility]


   Stop: 03/16/18 17:01





 Vital Signs - 8 hr











  03/16/18 03/16/18 03/16/18





  08:00 08:35 10:49


 


Temperature   


 


Pulse Rate   


 


Respiratory 16 16 14





Rate   


 


Blood Pressure   





(mmHg)   


 


O2 Sat by Pulse   





Oximetry   














  03/16/18 03/16/18 03/16/18





  11:10 11:52 12:26


 


Temperature 98.8 F  


 


Pulse Rate 83  


 


Respiratory 16 16 16





Rate   


 


Blood Pressure 114/69  





(mmHg)   


 


O2 Sat by Pulse 98  





Oximetry   














  03/16/18





  14:24


 


Temperature 


 


Pulse Rate 


 


Respiratory 16





Rate 


 


Blood Pressure 





(mmHg) 


 


O2 Sat by Pulse 





Oximetry 











Oxygen Devices in Use Now: None


Appearance: NAD, laying in bed


Ears/Nose/Mouth/Throat: Mucous Membranes Moist


Respiratory: Symmetrical Chest Expansion and Respiratory Effort, Clear to 

Auscultation


Cardiovascular: NL Sounds; No Murmurs; No JVD, RRR


Abdominal: NL Sounds; No Tenderness; No Distention


Extremities: No Edema


Skin: No Rash or Ulcers, - - Dressing to right knee clean, dry and intact


Neurological: Alert and Oriented x 3, NL Muscle Strength and Tone


Lines/Tubes/Other Access: Clean, Dry and Intact Peripheral IV - site benign


Nutrition: Taking PO's


Result Diagrams: 


 03/16/18 06:03





 03/16/18 06:03


Microbiology and Other Data: 


 Microbiology











 03/15/18 10:32 Anaerobic Culture - Preliminary





 Wound - Knee Right    No Growth Day 1





 Gram Stain - Final





 Wound Culture - Preliminary





    No Growth Day 1














Assess/Plan/Problems-Billing


Assessment: 





Ms. Fairchild is a 58 yo female with PMH significant for HTN, anxiety, cervical 

dystonia, chronic pain syndrome, ankylosing spondylitis, HLD, asthma, and 

osteoarthritis who presented to the hospital for an elective right total knee 

arthroplasy with Dr. Jeter.








- Patient Problems


(1) Status post total right knee replacement


Code(s): Z96.651 - PRESENCE OF RIGHT ARTIFICIAL KNEE JOINT   SNOMED Code(s): 

3478084866359


   Comment: 


- POD #1, management per ortho


- HH down from baseline, continue to trend


- Continue OT, PT, pain management, and bowel regimen   





(2) Anemia


Code(s): D64.9 - ANEMIA, UNSPECIFIED   SNOMED Code(s): 938789474


   Comment: 


- Acute blood loss anemia secondary to surgery


- Continue to trend HH   





(3) Dysphagia


Code(s): R13.10 - DYSPHAGIA, UNSPECIFIED   SNOMED Code(s): 50773789


   Comment: 


- S/P EGD last evening 


- GI consult, input appreciated


- Biopsy pending   





(4) HTN (hypertension)


Code(s): I10 - ESSENTIAL (PRIMARY) HYPERTENSION   SNOMED Code(s): 38923083


   Comment: 


- SBP 's


- Continue to hold lisinopril, will resume when able   





(5) History of asthma


Code(s): Z87.09 - PERSONAL HISTORY OF OTHER DISEASES OF THE RESPIRATORY SYSTEM 

  SNOMED Code(s): 708894765


   Comment: 


- No signs of acute exacerbation


- Continue singulair, dulera, allegra, and albuterol MDI PRN   





(6) Anxiety


Code(s): F41.9 - ANXIETY DISORDER, UNSPECIFIED   SNOMED Code(s): 02878743


   Comment: 


- Continue propranolol, clonazepam, venlafaxine, and BuSpar   





(7) Cervical dystonia


Code(s): G24.3 - SPASMODIC TORTICOLLIS   SNOMED Code(s): 853239875


   Comment: 


- Continue clonazepam   





(8) DVT prophylaxis


Code(s): FHN0684 -    SNOMED Code(s): 870523683


   Comment: 


- Lovenox bridge to warfarin   





(9) Full code status


Code(s): Z78.9 - OTHER SPECIFIED HEALTH STATUS   SNOMED Code(s): 663337742


   


Status and Disposition: 





Inpatient. Disposition per Ortho. Thank you for this consultation, we will 

continue to follow along.

## 2018-03-16 NOTE — PN
Progress Note





- Progress Note


Date of Service: 03/16/18


SOAP: 


Subjective:


56 y/o female s/p Right TKA by Dr. Jeter 3/15.  Overnight, patient complained 

of "something stuck in throat", seen by GI--> scoped.  no throat pain currently

, rec's to chew food well, patient understands.  VSS, afebrile overnight.  c/o 

pain, feeling like "poo".  





Objective:


General- Well appearing, NAD, AO resting in bed,  at bedside  


MSK- RLE- DF/PF +, PT 2+, negative homans sign dressing intact, no induration/ 

erythema around dressing site.   











Assessment:


Stable 56 y/o female s/p Right TKA by Dr. Jeter 3/15.








Plan:


- DVT prophylaxis- lovenox, coumadin 6mg tonight. 


- Continue PT/ OT 


- Follow up with Dr. Jeter within 10-14 days 


- H&H - stable 


- post-op IV ABX -- running 


- LIkely d/c home sunday 








 Vital Signs











Temp  98.9 F   03/16/18 07:59


 


Pulse  88   03/16/18 07:59


 


Resp  16   03/16/18 08:35


 


BP  149/41   03/16/18 07:59


 


Pulse Ox  94   03/16/18 07:59








 Intake & Output











 03/15/18 03/16/18 03/16/18





 18:59 06:59 18:59


 


Intake Total 3450 1447 


 


Output Total 1650 2600 


 


Balance 1800 -1153 


 


Weight 62.686 kg  


 


Intake:   


 


  IV Fluids 3450 1087 


 


    ABX - CEFAZOLIN  100 


 


    LR 3400 987 


 


    NS 50ML, Cefazolin 2G 50  


 


  Oral  360 


 


Output:   


 


  Irvin 1400 2600 


 


  Estimated Blood Loss 250  


 


Other:   


 


  # Bowel Movements  0 

















Acetaminophen (Tylenol Tab*)  650 mg PO Q4H PRN


   PRN Reason: PAIN OR TEMPERATURE


Albuterol (Ventolin Hfa Inhaler*)  1 puff INH BID PRN


   PRN Reason: SOB/WHEEZING


Bisacodyl (Dulcolax Supp*)  10 mg [place] DAILY PRN


   PRN Reason: constipation


Clonazepam (Klonopin Tab(*))  0.25 mg PO 0900,1700 SAIDA


   Last Admin: 03/16/18 08:35 Dose:  0.25 mg


Clonazepam (Klonopin Tab(*))  0.5 mg PO [facility]100 [facility]


   Last Admin: 03/15/18 22:00 Dose:  0.5 mg


Cyclobenzaprine HCl (Flexeril Tab*)  10 mg PO TID PRN


   PRN Reason: SPASMS


Diphenhydramine HCl (Benadryl Iv*)  12.5 mg IV Q6H PRN


   PRN Reason: PRURITIS


Docusate Sodium (Colace Cap*)  100 mg PO BID [facility]


   Last Admin: 03/16/18 08:35 Dose:  100 mg


Enoxaparin Sodium (Lovenox(*))  30 mg SUBCUT Q24H [facility]


Cefazolin Sodium/Dextrose (Kefzol 1 Gm In Dextrose Duplex (*))  1 gm in 50 mls 

@ 200 mls/hr IVPB Q8H [facility]


   Stop: 03/16/18 10:14


   Last Admin: 03/16/18 08:36 Dose:  200 mls/hr


Lactated Ringer's (Lactated Ringers 1000 Ml Bag*)  1,000 mls @ 100 mls/hr IV 

PER RATE [facility]


   Last Admin: 03/16/18 03:25 Dose:  100 mls/hr


Lactulose (Lactulose*)  30 ml PO Q6H PRN


   PRN Reason: constipation


Magnesium Hydroxide (Milk Of Magnesia Liq*)  30 ml PO BID [facility]


   Last Admin: 03/16/18 08:36 Dose:  30 ml


Magnesium Hydroxide (Milk Of Magnesia Liq*)  30 ml PO Q6H PRN


   PRN Reason: constipation


Morphine Sulfate (Morphine Inj (Syringe)*)  2 mg IV Q2H PRN


   PRN Reason: PAIN


   Last Admin: 03/15/18 19:30 Dose:  2 mg


Ondansetron HCl (Zofran Inj*)  4 mg IV Q6H PRN


   PRN Reason: nausea


Ondansetron HCl (Zofran Tab*)  4 mg PO Q6H PRN


   PRN Reason: NAUSEA


Oxybutynin Chloride (Ditropan Tab*)  5 mg PO BEDTIME [facility]


   Last Admin: 03/15/18 21:57 Dose:  Not Given


Oxycodone HCl (Roxycodone Tab*)  10 mg PO Q4H PRN


   PRN Reason: SEVERE PAIN


   Last Admin: 03/16/18 08:35 Dose:  10 mg


Oxycodone/Acetaminophen (Percocet 5/325 Tab*)  2 tab PO Q4H PRN


   PRN Reason: PAIN


   Last Admin: 03/16/18 06:20 Dose:  2 tab


Oxycodone/Acetaminophen (Percocet 5/325 Tab*)  1 tab PO Q4H PRN


   PRN Reason: PAIN


Pharmacy Profile Note (Coumadin Daily Reminder*)  1 note FOLLOW UP 1700 [facility]


   Last Admin: 03/15/18 16:59 Dose:  1 note


Polyethylene Glycol/Electrolytes (Miralax*)  17 gm PO DAILY PRN


   PRN Reason: Constipation


Propranolol HCl (Inderal Tab*)  20 mg PO BID [facility]


   Last Admin: 03/16/18 08:36 Dose:  20 mg


Trazodone HCl (Desyrel Tab*)  50 mg PO BEDTIME [facility]


   Last Admin: 03/15/18 22:00 Dose:  50 mg


Venlafaxine HCl (Effexor Xr Cap*)  150 mg PO BID [facility]


   Last Admin: 03/16/18 08:35 Dose:  150 mg


Warfarin Sodium (Coumadin Tab(*))  6 mg PO ONCE@1700 [facility]


   PRN Reason: Protocol


   Stop: 03/16/18 17:01

## 2018-03-16 NOTE — OP
OPERATIVE REPORT:

 

DATE OF OPERATION:  03/15/18

 

DATE OF BIRTH:  [birth year]

 

SURGEON:  Laure Jeter MD.

 

ASSISTANT:  ANDRE Tomlinson.

 

Ms. Becerra did help throughout the procedure with preparations of the leg, wound retraction, manipu
lation of the knee and wound closure.

 

ANESTHESIOLOGIST:  Dr. Gil.

 

ANESTHESIA:  Spinal.

 

PRE-OP DIAGNOSIS:  Severe posttraumatic osteoarthritis of the right knee joint.

 

POST-OP DIAGNOSIS:  Severe posttraumatic osteoarthritis of the right knee joint.

 

OPERATIVE PROCEDURE:  Right total knee arthroplasty.

 

TOURNIQUET TIME:  51 minutes.

 

COMPLICATIONS:  None.

 

SPECIMEN:  Multiple culture swabs sent to Pathology and Microbiology and multiple pieces of bone and 
cartilage from the right knee joint sent to Pathology.

 

HARDWARE USED:  This is a cemented, Smith and [employer]hew total knee arthroplasty hardware. Two packages o
f Simplex bone cement.  For the femur, a right, size 5, narrow posterior stabilized Legion Oxinium fe
moral component.  For the tibia, a size 3 right, tibial base plate Adrcy II.  For the insert, a 9-m
m posterior stabilized articular insert size 3-4 and for the patella, a 32-mm 7.56 and 3-peg all poly
 patella.

 

BRIEF HISTORY/INDICATION:  Ms. Fairchild is a 57-year-old female with a long history of right knee pain.  
She had ACL reconstruction in the past.  Over the years, she developed posttraumatic arthritis in the
 knee, which was tricompartmental and failed conservative treatment with antiinflammatories, pain med
ications, intraarticular injections, physical therapy as well as brace wear.  Her radiographs showed 
bone-on-bone contact with advanced arthritis which did appear to be posttraumatic.  Due to continued 
pain and decreased quality of life, the patient elected to undergo right total knee arthroplasty.  In
formed consent was obtained from the patient.  She understood the risk of surgery included, but were 
not limited to bleeding, infection, damage to nearby structures, continued pain, need for further teresa
christiano, intraoperative fracture, nerve palsy, hardware failure or loosening, knee stiffness, loss of mo
tion, stroke, heart attack, blood clot and death.  She wished to proceed.

 

INTRAOPERATIVE FINDINGS:  Intraoperatively, the patient had tricompartmental advanced arthritis and f
ull thickness loss of cartilage.  She did not have any evidence of infection or purulence.  Her inter
ference screws were not encountered during the procedure.  She did have valgus deformity of 10 degree
s that was corrected to anatomic valgus at the end of the procedure.

 

DESCRIPTION OF PROCEDURE:  Ms. Fairchild was identified in the preanesthesia unit.  Her right lower extrem
ity was marked as the correct operative side.  Informed consent was signed and placed in the chart.  
The patient was taken to the operating room and placed under spinal anesthesia.  A Irvin catheter was
 placed.  Tourniquet was placed on the right thigh.  Right lower extremity was prepped and draped in 
the usual sterile fashion.  Preop time-out was made to correctly identify the patient's side and site
.  Appropriate perioperative antibiotics were given within 1 hour of incision.

 

Tourniquet was inflated and total tourniquet time for this procedure was 51 minutes.  The patient's p
rior medial incision was used and extended 2 to 3 cm proximally.  A 10 blade was used to make the inc
ision and carried down to the extensor mechanism.  A new 10 blade was used to make a standard medial 
parapatellar arthrotomy. The patella was subluxed laterally.  Electrocautery was used to elevate the 
soft tissue off the superomedial tibia to the mid sagittal plane.  The knee was flexed up.  The anter
ior horn of the lateral meniscus was sharply released.  There was no ACL.  A drill was used to enter 
the distal femur.  Intramedullary distal femoral cutting guide was pinned on the distal femur.  Dista
l femoral condylar, lateral condylar hypoplasia was noted and accounted for.  Oscillating saw was use
d to make the distal cut.  Next, the external rotation guide was pinned on the distal femur and the d
istal femur was sized to a size 5.  A size 5 multi-cutting jig was pinned on the distal femur.  Oscil
lating saw was used to make the appropriate 4 chamfer cuts.

 

The PCL was completely released.  Tibia was subluxed anteriorly.  The intramedullary tibial cutting g
uide was used to make the proximal tibial cut with the oscillating saw.  This cut was made perpendicu
lar to the mechanical axis of the tibia.  The tibial interference screw was not encountered during th
is part of the procedure.  The knee was brought out into full extension.  The spacer block had good f
it with some tightness laterally.  A 15-blade was used to perform pie crusting technique of the later
al ligaments and medial lateral ligamentous balancing was improved.  Flexion and extension gaps were 
well balanced.

 

The knee was flexed up.  Lamina  was placed both medially and laterally. Any remaining menisc
us was carefully removed using electrocautery.  Any posterior osteophytes were removed with a curved 
osteotome.  Tibial tray and drop georgina were placed and confirmed a satisfactory tibial cut.

 

A size 5 right narrow femoral trial was chosen.  This was impacted on to the distal femur and had exc
ellent fit.  The box for the posterior stabilized implant was prepared using a box osteotome.  A size
 3 tibial tray trial with a 9-mm insert trial was chosen.  The knee was taken through a range of miko
on and had full extension to 130 degrees of flexion with very satisfactory patellofemoral tracking.

 

The patella was everted.  7 mm of patellar bone and cartilage was carefully removed using an oscillat
ing saw.  The patella was sized to a size 32.  Three-peg holes were drilled through the size 32 guide
.  A size 32 patella with a 7.5 thickness trial was placed and the knee was taken through a range of 
motion.  There was satisfactory patellofemoral tracking.

 

All trials were carefully removed.  The tibia was subluxed anteriorly and sized to a size 3.  Proxima
l tibia was prepared using a size 3 keel punch.  All bony cut surfaces were copiously irrigated with 
sterile saline and dried.  Final implants were cemented into place starting with the tibia followed b
y the femur and last the patella.  A 9-mm insert trial was placed and the knee was brought out into f
ull extension.  Tourniquet was turned down at 51 minutes.  The knee was copiously irrigated with ster
ile saline.

 

Once the cement had fully cured, the insert trial was removed.  Excess cement from around the capsule
 and hardware was carefully removed.  Final insert chosen was a 9- mm posterior stabilized articular 
insert, size 3-4.  This was locked into position on the tibial tray.  Stability of the insert was radha
cked and rechecked and noted to be stable.

 

The knee was once again copiously irrigated with sterile saline.  The extensor mechanism was closed o
yessica a medium Hemovac drain using interrupted #1 Vicryl.  The rest of the incision was closed in a lay
ered fashion using 0 and 2-0 Vicryl.  The skin was closed using running 3-0 nylon suture.  Sterile Xe
roform, 4x4s and Webril were used to cover the incision.  Ace wrap and cold pack were placed over thi
s.

 

The patient's anesthesia was reversed without difficulty.  She was taken to the PACU in stable condit
ion.  Intended weightbearing will be weightbearing as tolerated.  Intended DVT prophylaxis will be Co
umadin with a Lovenox bridge.

 

 [id number]/[facility] #: [id number]

## 2018-03-17 LAB
HCT VFR BLD AUTO: 32 % (ref 35–47)
HGB BLD-MCNC: 10.9 G/DL (ref 12–16)
INR PPP/BLD: 1.74 (ref 0.77–1.02)
PLATELET # BLD AUTO: 120 10^3/UL (ref 150–450)

## 2018-03-17 RX ADMIN — Medication SCH: at 11:23

## 2018-03-17 RX ADMIN — MOMETASONE FUROATE AND FORMOTEROL FUMARATE DIHYDRATE SCH PUFF: 200; 5 AEROSOL RESPIRATORY (INHALATION) at 19:34

## 2018-03-17 RX ADMIN — PROPRANOLOL HYDROCHLORIDE SCH MG: 20 TABLET ORAL at 08:07

## 2018-03-17 RX ADMIN — OXYBUTYNIN CHLORIDE SCH MG: 5 TABLET ORAL at 20:42

## 2018-03-17 RX ADMIN — PROPRANOLOL HYDROCHLORIDE SCH MG: 20 TABLET ORAL at 20:41

## 2018-03-17 RX ADMIN — MOMETASONE FUROATE AND FORMOTEROL FUMARATE DIHYDRATE SCH PUFF: 200; 5 AEROSOL RESPIRATORY (INHALATION) at 08:04

## 2018-03-17 RX ADMIN — AZELASTINE HYDROCHLORIDE AND FLUTICASONE PROPIONATE SCH: 137; 50 SPRAY, METERED NASAL at 11:23

## 2018-03-17 RX ADMIN — OXYCODONE HYDROCHLORIDE PRN MG: 5 CAPSULE ORAL at 04:08

## 2018-03-17 RX ADMIN — VENLAFAXINE HYDROCHLORIDE SCH MG: 75 CAPSULE, EXTENDED RELEASE ORAL at 08:07

## 2018-03-17 RX ADMIN — MAGNESIUM HYDROXIDE SCH ML: 400 SUSPENSION ORAL at 20:43

## 2018-03-17 RX ADMIN — AZELASTINE HYDROCHLORIDE AND FLUTICASONE PROPIONATE SCH SPRAY: 137; 50 SPRAY, METERED NASAL at 20:39

## 2018-03-17 RX ADMIN — OXYCODONE HYDROCHLORIDE AND ACETAMINOPHEN PRN TAB: 5; 325 TABLET ORAL at 20:40

## 2018-03-17 RX ADMIN — DOCUSATE SODIUM SCH MG: 100 CAPSULE, LIQUID FILLED ORAL at 08:07

## 2018-03-17 RX ADMIN — CLONAZEPAM SCH MG: 0.5 TABLET ORAL at 08:08

## 2018-03-17 RX ADMIN — CLONAZEPAM SCH MG: 0.5 TABLET ORAL at 17:34

## 2018-03-17 RX ADMIN — OXYCODONE HYDROCHLORIDE AND ACETAMINOPHEN PRN TAB: 5; 325 TABLET ORAL at 08:08

## 2018-03-17 RX ADMIN — OXYCODONE HYDROCHLORIDE AND ACETAMINOPHEN PRN TAB: 5; 325 TABLET ORAL at 01:56

## 2018-03-17 RX ADMIN — OXYCODONE HYDROCHLORIDE AND ACETAMINOPHEN PRN TAB: 5; 325 TABLET ORAL at 13:17

## 2018-03-17 RX ADMIN — DOCUSATE SODIUM SCH MG: 100 CAPSULE, LIQUID FILLED ORAL at 20:42

## 2018-03-17 RX ADMIN — CLONAZEPAM SCH MG: 0.5 TABLET ORAL at 20:42

## 2018-03-17 RX ADMIN — VENLAFAXINE HYDROCHLORIDE SCH MG: 75 CAPSULE, EXTENDED RELEASE ORAL at 20:42

## 2018-03-17 RX ADMIN — TRAZODONE HYDROCHLORIDE SCH MG: 50 TABLET ORAL at 20:42

## 2018-03-17 RX ADMIN — MAGNESIUM HYDROXIDE SCH ML: 400 SUSPENSION ORAL at 08:17

## 2018-03-17 RX ADMIN — MONTELUKAST SODIUM SCH MG: 10 TABLET, COATED ORAL at 17:34

## 2018-03-17 NOTE — PN
Progress Note





- Progress Note


Date of Service: 03/17/18


SOAP: 


Subjective:


Pt resting comfortably in chair. States feeling very sleepy this AM. No 

complaints overnight. Pain well controlled. Denies F/C/N/T





Vital Signs:











Temp Pulse Resp BP Pulse Ox


 


 98.0 F   82   16   116/78   94 


 


 03/17/18 12:04  03/17/18 12:04  03/17/18 12:04  03/17/18 12:04  03/17/18 12:10








 Laboratory Last Values











Hgb  10.9 g/dl (12.0-16.0)  L  03/17/18  05:30    


 


Hct  32 % (35-47)  L  03/17/18  05:30    


 


Plt Count  120 10^3/ul (150-450)  L  03/17/18  05:30    


 


MPV  10 um3 (7.4-10.4)   03/17/18  05:30    


 


INR (Anticoag Therapy)  1.74  (0.77-1.02)  H  03/17/18  05:30    


 


Sodium  138 mmol/L (133-145)   03/16/18  06:03    


 


Potassium  3.7 mmol/L (3.5-5.0)   03/16/18  06:03    


 


Chloride  104 mmol/L (101-111)   03/16/18  06:03    


 


Carbon Dioxide  32 mmol/L (22-32)   03/16/18  06:03    


 


Anion Gap  2 mmol/L (2-11)   03/16/18  06:03    


 


BUN  8 mg/dL (6-24)   03/16/18  06:03    


 


Creatinine  0.63 mg/dL (0.51-0.95)   03/16/18  06:03    


 


Est GFR ( Amer)  124.8  (>60)   03/16/18  06:03    


 


Est GFR (Non-Af Amer)  97.1  (>60)   03/16/18  06:03    


 


BUN/Creatinine Ratio  12.7  (8-20)   03/16/18  06:03    


 


Glucose  90 mg/dL ()   03/16/18  06:03    


 


Calcium  8.4 mg/dL (8.6-10.3)  L  03/16/18  06:03    


 


Blood Type  O Positive   03/15/18  08:11    


 


Antibody Screen  Negative   03/15/18  08:11    











Objective:


Dressing changed by Dr. Corona, incision C/D/I. Calves soft, nontender. No 

edema. Sensation intact to lite touch. 2+ DP pulses.








Assessment:


56 yo female s/p Right TKA POD#2








Plan:


OOB, PT/OT WBAT


Pain control


DVT Prophylaxis - Coumadin 4 mg tonight


Plan for D/C home tomorrow

## 2018-03-17 NOTE — PN
Subjective


Date of Service: 03/17/18


Interval History: 





Patient seen and examined at bedside. Denies fever, chills, shortness of breath

, chest discomfort, N/V/D. Pt reports lightheadedness when up and "feeling 

sleepy" today. She has not been up with physical therapy yet. She reports that 

the sensation of something in her esophagus has resolved.








Family History: Unchanged from Admission


Social History: Unchanged from Admission


Past Medical History: Unchanged from Admission





Objective


Active Medications: 





Acetaminophen (Tylenol Tab*)  650 mg PO Q4H PRN Reason: PAIN OR TEMPERATURE


Albuterol (Ventolin Hfa Inhaler*)  1 puff INH BID PRN Reason: SOB/WHEEZING


Azelastine/Fluticasone (Dymista(Nf))  1 spray BOTH NARES BID [facility]


Bisacodyl (Dulcolax Supp*)  10 mg MS DAILY PRN Reason: constipation


Clonazepam (Klonopin Tab(*))  0.25 mg PO 0900,1700 [facility]


Clonazepam (Klonopin Tab(*))  0.5 mg PO 2100 [facility]


Cyclobenzaprine HCl (Flexeril Tab*)  10 mg PO TID PRN Reason: SPASMS


Diphenhydramine HCl (Benadryl Iv*)  12.5 mg IV Q6H PRN Reason: PRURITIS


Docusate Sodium (Colace Cap*)  100 mg PO BID [facility]


Lactated Ringer's (Lactated Ringers 1000 Ml Bag*)  1,000 mls @ 100 mls/hr IV 

PER RATE [facility]


Lactulose (Lactulose*)  30 ml PO Q6H PRN Reason: constipation


Magnesium Hydroxide (Milk Of Magnesia Liq*)  30 ml PO BID [facility]


Magnesium Hydroxide (Milk Of Magnesia Liq*)  30 ml PO Q6H PRN Reason: 

constipation


Mometasone Furoate/Formoterol Fumar (Dulera 200/5 Mdi*)  1 puff INH BID [facility]


Montelukast Sodium (Singulair Tab*)  10 mg PO QPM [facility]


Morphine Sulfate (Morphine Inj (Syringe)*)  2 mg IV Q2H PRN Reason: PAIN


Nf: [place] Eye Drops (0.7%)  1 drop BOTH EYES QAM [facility]


Ondansetron HCl (Zofran Inj*)  4 mg IV Q6H PRN Reason: nausea


Ondansetron HCl (Zofran Tab*)  4 mg PO Q6H PRN Reason: NAUSEA


Oxybutynin Chloride (Ditropan Tab*)  5 mg PO BEDTIME [facility]


Oxycodone HCl (Roxycodone Tab*)  10 mg PO Q4H PRN Reason: SEVERE PAIN


Oxycodone/Acetaminophen (Percocet 5/325 Tab*)  2 tab PO Q4H PRN Reason: PAIN


Oxycodone/Acetaminophen (Percocet 5/325 Tab*)  1 tab PO Q4H PRN Reason: PAIN


Pharmacy Profile Note (Coumadin Daily Reminder*)  1 note FOLLOW UP 1700 [facility]


Polyethylene Glycol/Electrolytes (Miralax*)  17 gm PO DAILY PRN Reason: 

Constipation


Propranolol HCl (Inderal Tab*)  20 mg PO BID [facility]


Trazodone HCl (Desyrel Tab*)  50 mg PO BEDTIME [facility]


Venlafaxine HCl (Effexor Xr Cap*)  150 mg PO BID [facility]





 Vital Signs - 8 hr











  03/17/18 03/17/18 03/17/18





  03:15 04:05 04:08


 


Temperature  99.1 F 


 


Pulse Rate  93 


 


Respiratory  16 16





Rate   


 


Blood Pressure  120/77 





(mmHg)   


 


O2 Sat by Pulse 98 91 





Oximetry   














  03/17/18 03/17/18 03/17/18





  07:46 07:51 08:00


 


Temperature 97.8 F  


 


Pulse Rate 87  


 


Respiratory 14 14 14





Rate   


 


Blood Pressure 112/76  





(mmHg)   


 


O2 Sat by Pulse 92  92





Oximetry   














  03/17/18





  08:08


 


Temperature 


 


Pulse Rate 


 


Respiratory 14





Rate 


 


Blood Pressure 





(mmHg) 


 


O2 Sat by Pulse 





Oximetry 











Oxygen Devices in Use Now: Nasal Cannula - 2L


Appearance: NAD, laying in bed


Ears/Nose/Mouth/Throat: Mucous Membranes Moist


Respiratory: Symmetrical Chest Expansion and Respiratory Effort, Clear to 

Auscultation


Cardiovascular: NL Sounds; No Murmurs; No JVD, RRR


Abdominal: NL Sounds; No Tenderness; No Distention


Extremities: No Edema


Skin: No Rash or Ulcers, - - Dressing to right knee clean, dry and intact


Neurological: Alert and Oriented x 3, NL Muscle Strength and Tone


Lines/Tubes/Other Access: Clean, Dry and Intact Peripheral IV - site benign


Nutrition: Taking PO's


Result Diagrams: 


 03/17/18 05:30





 03/16/18 06:03


Microbiology and Other Data: 


 Microbiology











 03/15/18 10:32 Anaerobic Culture - Preliminary





 Wound - Knee Right    No Growth Day 1





 Gram Stain - Final





 Wound Culture - Preliminary





    No Growth Day 1














Assess/Plan/Problems-Billing


Assessment: 





Ms. Fairchild is a 58 yo female with PMH significant for HTN, anxiety, cervical 

dystonia, chronic pain syndrome, ankylosing spondylitis, HLD, asthma, and 

osteoarthritis who presented to the hospital for an elective right total knee 

arthroplasy with Dr. Jeter.








- Patient Problems


(1) Status post total right knee replacement


Code(s): Z96.651 - PRESENCE OF RIGHT ARTIFICIAL KNEE JOINT   SNOMED Code(s): 

2453276805757


   Comment: 


- POD #2, management per ortho


- HH down from baseline, but stable, continue to trend


- Continue OT, PT, pain management, and bowel regimen   





(2) Anemia


Code(s): D64.9 - ANEMIA, UNSPECIFIED   SNOMED Code(s): 220309034


   Comment: 


- Acute blood loss anemia secondary to surgery


- Continue to trend HH   





(3) Dysphagia


Code(s): R13.10 - DYSPHAGIA, UNSPECIFIED   SNOMED Code(s): 73084774


   Comment: 


- S/P EGD 3/15


- GI consult, input appreciated


- Biopsy pending   





(4) HTN (hypertension)


Code(s): I10 - ESSENTIAL (PRIMARY) HYPERTENSION   SNOMED Code(s): 78676852


   Comment: 


- -140's


- Continue propanolol   





(5) History of asthma


Code(s): Z87.09 - PERSONAL HISTORY OF OTHER DISEASES OF THE RESPIRATORY SYSTEM 

  SNOMED Code(s): 767162354


   Comment: 


- No signs of acute exacerbation


- Continue singulair, dulera, allegra, and albuterol MDI PRN   





(6) Anxiety


Code(s): F41.9 - ANXIETY DISORDER, UNSPECIFIED   SNOMED Code(s): 23506428


   Comment: 


- Continue propranolol, clonazepam, venlafaxine, and BuSpar   





(7) Cervical dystonia


Code(s): G24.3 - SPASMODIC TORTICOLLIS   SNOMED Code(s): 778465549


   Comment: 


- Continue clonazepam   





(8) DVT prophylaxis


Code(s): TBC4914 -    SNOMED Code(s): 878418627


   Comment: 


- Lovenox bridge to warfarin   





(9) Full code status


Code(s): Z78.9 - OTHER SPECIFIED HEALTH STATUS   SNOMED Code(s): 863983066


   


Status and Disposition: 





Inpatient. Disposition per Ortho. Thank you for this consultation, we will 

continue to follow along.

## 2018-03-18 LAB
HCT VFR BLD AUTO: 30 % (ref 35–47)
HGB BLD-MCNC: 10.3 G/DL (ref 12–16)
INR PPP/BLD: 2.76 (ref 0.77–1.02)
PLATELET # BLD AUTO: 136 10^3/UL (ref 150–450)

## 2018-03-18 RX ADMIN — VENLAFAXINE HYDROCHLORIDE SCH MG: 75 CAPSULE, EXTENDED RELEASE ORAL at 08:52

## 2018-03-18 RX ADMIN — AZELASTINE HYDROCHLORIDE AND FLUTICASONE PROPIONATE SCH SPRAY: 137; 50 SPRAY, METERED NASAL at 08:53

## 2018-03-18 RX ADMIN — ACETAMINOPHEN SCH MG: 325 TABLET ORAL at 17:31

## 2018-03-18 RX ADMIN — ACETAMINOPHEN SCH MG: 325 TABLET ORAL at 12:47

## 2018-03-18 RX ADMIN — CLONAZEPAM SCH MG: 0.5 TABLET ORAL at 20:03

## 2018-03-18 RX ADMIN — Medication SCH DROP: at 08:52

## 2018-03-18 RX ADMIN — MAGNESIUM HYDROXIDE SCH ML: 400 SUSPENSION ORAL at 08:51

## 2018-03-18 RX ADMIN — DOCUSATE SODIUM SCH: 100 CAPSULE, LIQUID FILLED ORAL at 19:50

## 2018-03-18 RX ADMIN — CLONAZEPAM SCH MG: 0.5 TABLET ORAL at 08:51

## 2018-03-18 RX ADMIN — MAGNESIUM HYDROXIDE SCH: 400 SUSPENSION ORAL at 19:50

## 2018-03-18 RX ADMIN — MONTELUKAST SODIUM SCH MG: 10 TABLET, COATED ORAL at 17:32

## 2018-03-18 RX ADMIN — DOCUSATE SODIUM SCH MG: 100 CAPSULE, LIQUID FILLED ORAL at 08:51

## 2018-03-18 RX ADMIN — OXYBUTYNIN CHLORIDE SCH MG: 5 TABLET ORAL at 20:03

## 2018-03-18 RX ADMIN — CLONAZEPAM SCH MG: 0.5 TABLET ORAL at 17:31

## 2018-03-18 RX ADMIN — MOMETASONE FUROATE AND FORMOTEROL FUMARATE DIHYDRATE SCH PUFF: 200; 5 AEROSOL RESPIRATORY (INHALATION) at 08:47

## 2018-03-18 RX ADMIN — ACETAMINOPHEN SCH MG: 325 TABLET ORAL at 23:46

## 2018-03-18 RX ADMIN — TRAZODONE HYDROCHLORIDE SCH MG: 50 TABLET ORAL at 20:04

## 2018-03-18 RX ADMIN — AZELASTINE HYDROCHLORIDE AND FLUTICASONE PROPIONATE SCH SPRAY: 137; 50 SPRAY, METERED NASAL at 20:04

## 2018-03-18 RX ADMIN — MOMETASONE FUROATE AND FORMOTEROL FUMARATE DIHYDRATE SCH PUFF: 200; 5 AEROSOL RESPIRATORY (INHALATION) at 19:54

## 2018-03-18 RX ADMIN — VENLAFAXINE HYDROCHLORIDE SCH MG: 75 CAPSULE, EXTENDED RELEASE ORAL at 20:03

## 2018-03-18 NOTE — PN
Subjective


Date of Service: 03/18/18


Interval History: 





Patient seen and examined at bedside. Denies fever, chills, shortness of breath

, chest discomfort, V/D. Pt states that she continues to have lightheadedness 

and dizziness when up. She received a 500 ml fluid bolus overnight. Pt has 

orthostatic VS this AM and is significantly orthostatic. Pt states that pain is 

better controlled today, and NSG staff are trying to limit narcotics. Pt isn't 

eating much, she is a picky eater but has been forcing herself to eat while she 

has been her. She also reports a poor appetite. She also reports intermittent 

nausea when up.








Family History: Unchanged from Admission


Social History: Unchanged from Admission


Past Medical History: Unchanged from Admission





Objective


Active Medications: 





Acetaminophen (Tylenol Tab*)  650 mg PO Q4H PRN Reason: PAIN OR TEMPERATURE


Albuterol (Ventolin Hfa Inhaler*)  1 puff INH BID PRN Reason: SOB/WHEEZING


Azelastine/Fluticasone (Dymista(Nf))  1 spray BOTH NARES BID [facility]


Bisacodyl (Dulcolax Supp*)  10 mg [place] DAILY PRN Reason: constipation


Clonazepam (Klonopin Tab(*))  0.25 mg PO 0900,1700 [facility]


Clonazepam (Klonopin Tab(*))  0.5 mg PO 2100 [facility]


Cyclobenzaprine HCl (Flexeril Tab*)  10 mg PO TID PRN Reason: SPASMS


Diphenhydramine HCl (Benadryl Iv*)  12.5 mg IV Q6H PRN Reason: PRURITIS


Docusate Sodium (Colace Cap*)  100 mg PO BID [facility]


Sodium Chloride (Ns 0.9% 500 Ml*)  500 mls @ 150 mls/hr IV PER RATE [facility]


Lactated Ringer's (Lactated Ringers 1000 Ml Bag*)  1,000 mls @ 1,000 mls/hr IV 

.BOLUS [facility]


Lactulose (Lactulose*)  30 ml PO Q6H PRN Reason: constipation


Magnesium Hydroxide (Milk Of Magnesia Liq*)  30 ml PO BID [facility]


Magnesium Hydroxide (Milk Of Magnesia Liq*)  30 ml PO Q6H PRN Reason: 

constipation


Mometasone Furoate/Formoterol Fumar (Dulera 200/5 Mdi*)  1 puff INH BID [facility]


Montelukast Sodium (Singulair Tab*)  10 mg PO QPM [facility]


Morphine Sulfate (Morphine Inj (Syringe)*)  2 mg IV Q2H PRN Reason: PAIN


Pto: [place] Eye Drops (0.7%)  1 drop BOTH EYES QAM [facility]


Ondansetron HCl (Zofran Inj*)  4 mg IV Q6H PRN Reason: nausea


Ondansetron HCl (Zofran Tab*)  4 mg PO Q6H PRN Reason: NAUSEA


Oxybutynin Chloride (Ditropan Tab*)  5 mg PO BEDTIME [facility]


Oxycodone HCl (Roxycodone Tab*)  10 mg PO Q4H PRN Reason: SEVERE PAIN


Oxycodone/Acetaminophen (Percocet 5/325 Tab*)  2 tab PO Q4H PRN Reason: PAIN


Oxycodone/Acetaminophen (Percocet 5/325 Tab*)  1 tab PO Q4H PRN Reason: PAIN


Pharmacy Profile Note (Coumadin Daily Reminder*)  1 note FOLLOW UP 1700 [facility]


Polyethylene Glycol/Electrolytes (Miralax*)  17 gm PO DAILY PRN Reason: 

Constipation


Propranolol HCl (Inderal Tab*)  20 mg PO BID [facility]


Trazodone HCl (Desyrel Tab*)  50 mg PO BEDTIME [facility]


Venlafaxine HCl (Effexor Xr Cap*)  150 mg PO BID [facility]





 Vital Signs - 8 hr











  03/18/18 03/18/18 03/18/18





  00:16 00:35 03:28


 


Temperature 98.8 F 98.9 F 99.3 F


 


Pulse Rate 107 105 93


 


Respiratory 18 16 16





Rate   


 


Blood Pressure 100/57 103/71 126/77





(mmHg)   


 


O2 Sat by Pulse 84 93 95





Oximetry   














  03/18/18 03/18/18 03/18/18





  07:29 07:31 07:35


 


Temperature 98.8 F  


 


Pulse Rate 81 86 93


 


Respiratory 16 16 





Rate   


 


Blood Pressure 106/74 82/60 57/39





(mmHg)   


 


O2 Sat by Pulse 95 91 96





Oximetry   











Oxygen Devices in Use Now: None


Appearance: NAD, laying in bed


Respiratory: Symmetrical Chest Expansion and Respiratory Effort, Clear to 

Auscultation


Cardiovascular: NL Sounds; No Murmurs; No JVD, RRR


Abdominal: NL Sounds; No Tenderness; No Distention


Extremities: - - Mild right LE edema


Skin: - - Dressing to right knee clean, dry and intact


Neurological: Alert and Oriented x 3, NL Muscle Strength and Tone


Lines/Tubes/Other Access: Clean, Dry and Intact Peripheral IV - site benign


Nutrition: Taking PO's


Result Diagrams: 


 03/18/18 05:12





 03/16/18 06:03


Microbiology and Other Data: 


 Microbiology











 03/15/18 10:32 Anaerobic Culture - Preliminary





 Wound - Knee Right    No Growth Day 1





 Gram Stain - Final





 Wound Culture - Preliminary





    No Growth Day 1














Assess/Plan/Problems-Billing


Assessment: 





Ms. Fairchild is a 58 yo female with PMH significant for HTN, anxiety, cervical 

dystonia, chronic pain syndrome, ankylosing spondylitis, HLD, asthma, and 

osteoarthritis who presented to the hospital for an elective right total knee 

arthroplasy with Dr. Jeter.








- Patient Problems


(1) Status post total right knee replacement


Code(s): Z96.651 - PRESENCE OF RIGHT ARTIFICIAL KNEE JOINT   SNOMED Code(s): 

9402864408974


   Comment: 


- POD #3, management per ortho


- HH down from baseline, but stable, continue to trend


- Limit narcotics, will start Tylenol ATC


- Continue OT, PT, pain management, and bowel regimen


   





(2) Orthostatic dizziness


Code(s): R42 - DIZZINESS AND GIDDINESS   SNOMED Code(s): 962193337


   Comment: 


- Pt with significant orthostatic hypotension


- Suspect she is dehydrated


- Will give a 1 L fluid bolus, hold propanolol and check orthostatic VS later 

today. Will also give gentle IVFs overnight   





(3) Anemia


Code(s): D64.9 - ANEMIA, UNSPECIFIED   SNOMED Code(s): 738177539


   Comment: 


- Acute blood loss anemia secondary to surgery


- HH stable, continue to trend HH   





(4) Dysphagia


Code(s): R13.10 - DYSPHAGIA, UNSPECIFIED   SNOMED Code(s): 38046580


   Comment: 


- S/P EGD 3/15


- GI consult, input appreciated


- Pt encouraged to take small bites and chew food well


- Biopsy pending   





(5) HTN (hypertension)


Code(s): I10 - ESSENTIAL (PRIMARY) HYPERTENSION   SNOMED Code(s): 77293975


   Comment: 


- SBP 's


- Hold propanolol   





(6) History of asthma


Code(s): Z87.09 - PERSONAL HISTORY OF OTHER DISEASES OF THE RESPIRATORY SYSTEM 

  SNOMED Code(s): 767305261


   Comment: 


- No signs of acute exacerbation


- Continue singulair, dulera, allegra, and albuterol MDI PRN   





(7) Anxiety


Code(s): F41.9 - ANXIETY DISORDER, UNSPECIFIED   SNOMED Code(s): 42790060


   Comment: 


- Continue clonazepam, venlafaxine, and BuSpar   





(8) Cervical dystonia


Code(s): G24.3 - SPASMODIC TORTICOLLIS   SNOMED Code(s): 066878757


   Comment: 


- Continue clonazepam   





(9) DVT prophylaxis


Code(s): LTR5951 -    SNOMED Code(s): 853513069


   Comment: 


- Lovenox bridge to warfarin   





(10) Full code status


Code(s): Z78.9 - OTHER SPECIFIED HEALTH STATUS   SNOMED Code(s): 275292722


   


Status and Disposition: 





Inpatient. Disposition per Ortho. Thank you for this consultation, we will 

continue to follow along.

## 2018-03-18 NOTE — PN
Progress Note





- Progress Note


Date of Service: 03/18/18


SOAP: 


Subjective:


Pt lying comfortably in bed. She reports dizziness when standing. Pain is well 

controlled. Denies CP, SOB, F/C





Vital Signs:











Temp Pulse Resp BP Pulse Ox


 


 98.8 F   93   16   57/39   96 


 


 03/18/18 07:29  03/18/18 07:35  03/18/18 08:51  03/18/18 07:35  03/18/18 07:35








 Laboratory Last Values











Hgb  10.3 g/dl (12.0-16.0)  L  03/18/18  05:12    


 


Hct  30 % (35-47)  L  03/18/18  05:12    


 


Plt Count  136 10^3/ul (150-450)  L  03/18/18  05:12    


 


MPV  10 um3 (7.4-10.4)   03/18/18  05:12    


 


INR (Anticoag Therapy)  2.76  (0.77-1.02)  H  03/18/18  05:12    


 


Sodium  138 mmol/L (133-145)   03/16/18  06:03    


 


Potassium  3.7 mmol/L (3.5-5.0)   03/16/18  06:03    


 


Chloride  104 mmol/L (101-111)   03/16/18  06:03    


 


Carbon Dioxide  32 mmol/L (22-32)   03/16/18  06:03    


 


Anion Gap  2 mmol/L (2-11)   03/16/18  06:03    


 


BUN  8 mg/dL (6-24)   03/16/18  06:03    


 


Creatinine  0.63 mg/dL (0.51-0.95)   03/16/18  06:03    


 


Est GFR ( Amer)  124.8  (>60)   03/16/18  06:03    


 


Est GFR (Non-Af Amer)  97.1  (>60)   03/16/18  06:03    


 


BUN/Creatinine Ratio  12.7  (8-20)   03/16/18  06:03    


 


Glucose  90 mg/dL ()   03/16/18  06:03    


 


Calcium  8.4 mg/dL (8.6-10.3)  L  03/16/18  06:03    


 


Blood Type  O Positive   03/15/18  08:11    


 


Antibody Screen  Negative   03/15/18  08:11    











Objective:


Dressing C/D/I. Calves soft, nontender. No edema. Sensation intact. 2+ DP pulses








Assessment:


58 yo Female s/p right TKA 3/15/18 by Dr. Jeter POD #3








Plan:


OOB, PT/OT WBAT


Pain control


DVT prohylaxis - Hold coumadin today


Orthostatic hypotension - Agree with fluid bolus, Appreciate hospitalist input


D/C likely held today until medically stable

## 2018-03-19 VITALS — SYSTOLIC BLOOD PRESSURE: 137 MMHG | DIASTOLIC BLOOD PRESSURE: 81 MMHG

## 2018-03-19 LAB
HCT VFR BLD AUTO: 28 % (ref 35–47)
HGB BLD-MCNC: 9.4 G/DL (ref 12–16)
INR PPP/BLD: 2.1 (ref 0.77–1.02)
PLATELET # BLD AUTO: 142 10^3/UL (ref 150–450)

## 2018-03-19 RX ADMIN — MAGNESIUM HYDROXIDE SCH: 400 SUSPENSION ORAL at 07:42

## 2018-03-19 RX ADMIN — Medication SCH DROP: at 09:00

## 2018-03-19 RX ADMIN — VENLAFAXINE HYDROCHLORIDE SCH MG: 75 CAPSULE, EXTENDED RELEASE ORAL at 08:59

## 2018-03-19 RX ADMIN — CLONAZEPAM SCH MG: 0.5 TABLET ORAL at 08:59

## 2018-03-19 RX ADMIN — MOMETASONE FUROATE AND FORMOTEROL FUMARATE DIHYDRATE SCH PUFF: 200; 5 AEROSOL RESPIRATORY (INHALATION) at 08:59

## 2018-03-19 RX ADMIN — ONDANSETRON PRN MG: 2 INJECTION INTRAMUSCULAR; INTRAVENOUS at 04:22

## 2018-03-19 RX ADMIN — ACETAMINOPHEN SCH MG: 325 TABLET ORAL at 05:50

## 2018-03-19 RX ADMIN — AZELASTINE HYDROCHLORIDE AND FLUTICASONE PROPIONATE SCH SPRAY: 137; 50 SPRAY, METERED NASAL at 08:59

## 2018-03-19 RX ADMIN — ACETAMINOPHEN SCH MG: 325 TABLET ORAL at 12:23

## 2018-03-19 RX ADMIN — DOCUSATE SODIUM SCH: 100 CAPSULE, LIQUID FILLED ORAL at 07:42

## 2018-03-19 NOTE — PN
Progress Note





- Progress Note


Date of Service: 03/19/18


SOAP: 


Subjective:


56 y/o female s/p Right TKA by Dr. Jeter 3/15.   Pt with orthostatic, 

dizziness over weekend.   Multiple bouts of diarrhea, c/o weakness.   holding 

down water well, + nausea, no vomiting.   Pain controlled.   WOrking with PT in 

room.  VSS afebrile overnight.   








Objective:


General-  Well appearing, NAD 


MSK- RLE- dressing removed, i c/d/i, no swelling, erythema, sutures in place, 

redressed.   SITLT, PT 2+, + DF/PF against resistence.  





 Vital Signs











Temp  98.9 F   03/19/18 07:27


 


Pulse  111   03/19/18 08:00


 


Resp  18   03/19/18 08:59


 


BP  109/83   03/19/18 08:00


 


Pulse Ox  92   03/19/18 07:27








 Intake & Output











 03/18/18 03/19/18 03/19/18





 18:59 06:59 18:59


 


Intake Total 1811 2030 


 


Output Total 1200 1000 


 


Balance 611 1030 


 


Intake:   


 


  IV Fluids 991 880 


 


    LR  880 


 


    NS (0.9%) 991  


 


  Oral 820 1150 


 


Output:   


 


  Urine 1200 1000 


 


Other:   


 


  Estimated Void Medium  


 


  Date of Last Bowel  3/19/8 





  Movement   


 


  # Bowel Movements 1 1 


 


  Estimated Stool Amount Large Medium 


 


  # Voids 1  

















Assessment:


Stable 56 y/o female s/p Right TKA by Dr. Jeter 3/15.








Plan:


- Orthostatics- increase in HR, BP stable, patient denies symptoms 


- H&H stable 


- DVT prophylaxis- INR theraputic. COumadin dosing: 


3/19- 2 mg 


3/20- 4mg 


3/21- 2mg 


3/22- INR draw   


- OR cx's neg. 


- Home today - Follow up with  within 7-10 days

## 2018-03-19 NOTE — PN
Subjective


Date of Service: 03/19/18


Interval History: 





Patient seen and examined at bedside. Denies fever, chills, shortness of breath

, chest discomfort, N/V/D. Pt states that her pain is well controlled.








Family History: Unchanged from Admission


Social History: Unchanged from Admission


Past Medical History: Unchanged from Admission





Objective


Active Medications: 





Acetaminophen (Tylenol Tab*)  975 mg PO Q6H [facility]


Albuterol (Ventolin Hfa Inhaler*)  1 puff INH BID PRN Reason: SOB/WHEEZING


Azelastine/Fluticasone (Dymista(Nf))  1 spray BOTH NARES BID [facility]


Bisacodyl (Dulcolax Supp*)  10 mg [place] DAILY PRN Reason: constipation


Clonazepam (Klonopin Tab(*))  0.25 mg PO 0900,1700 [facility]


Clonazepam (Klonopin Tab(*))  0.5 mg PO 2100 [facility]


Cyclobenzaprine HCl (Flexeril Tab*)  10 mg PO TID PRN Reason: SPASMS


Diphenhydramine HCl (Benadryl Iv*)  12.5 mg IV Q6H PRN Reason: PRURITIS


Docusate Sodium (Colace Cap*)  100 mg PO BID [facility]


Lactated Ringer's (Lactated Ringers 1000 Ml Bag*)  1,000 mls @ 1,000 mls/hr IV 

.BOLUS [facility]


Dextrose/Sodium Chloride (D5w 1/2 Ns 1000 Ml Bag*)  1,000 mls @ 100 mls/hr IV 

PER RATE [facility]


Lactulose (Lactulose*)  30 ml PO Q6H PRN Reason: constipation


Magnesium Hydroxide (Milk Of Magnesia Liq*)  30 ml PO BID [facility]


Magnesium Hydroxide (Milk Of Magnesia Liq*)  30 ml PO Q6H PRN Reason: 

constipation


Mometasone Furoate/Formoterol Fumar (Dulera 200/5 Mdi*)  1 puff INH BID [facility]


Montelukast Sodium (Singulair Tab*)  10 mg PO QPM [facility]


Pto: [place] Eye Drops (0.7%)  1 drop BOTH EYES QAM [facility]


Ondansetron HCl (Zofran Inj*)  4 mg IV Q6H PRN Reason: nausea


Ondansetron HCl (Zofran Tab*)  4 mg PO Q6H PRN Reason: NAUSEA


Oxybutynin Chloride (Ditropan Tab*)  5 mg PO BEDTIME [facility]


Oxycodone HCl (Roxycodone Tab*)  5 mg PO Q4H PRN Reason: PAIN - MODERATE TO 

SEVERE


Oxycodone HCl (Roxycodone Tab*)  10 mg PO Q4H PRN Reason: SEVERE PAIN


Pharmacy Profile Note (Coumadin Daily Reminder*)  1 note FOLLOW UP 1700 [facility]


Pharmacy Profile Note (Scopolamine Patch Remove*)  1 note PATCH OFF .AFTER 72 

HOURS [facility]


Polyethylene Glycol/Electrolytes (Miralax*)  17 gm PO DAILY PRN Reason: 

Constipation


Scopolamine (Transderm-Scop 1.5 Mg Patch*)  1 patch TRANSDERM Q72H [facility]


Trazodone HCl (Desyrel Tab*)  50 mg PO BEDTIME [facility]


Venlafaxine HCl (Effexor Xr Cap*)  150 mg PO BID [facility]





 Vital Signs - 8 hr











  03/19/18 03/19/18 03/19/18





  04:17 07:27 07:55


 


Temperature 98.6 F 98.9 F 


 


Pulse Rate 103 98 98


 


Respiratory 16 16 





Rate   


 


Blood Pressure 131/84 124/76 120/77





(mmHg)   


 


O2 Sat by Pulse 94 92 





Oximetry   














  03/19/18 03/19/18 03/19/18





  07:57 08:00 08:59


 


Temperature   


 


Pulse Rate 108 111 


 


Respiratory  18 18





Rate   


 


Blood Pressure 128/48 109/83 





(mmHg)   


 


O2 Sat by Pulse  92 





Oximetry   














  03/19/18





  10:59


 


Temperature 


 


Pulse Rate 


 


Respiratory 18





Rate 


 


Blood Pressure 





(mmHg) 


 


O2 Sat by Pulse 





Oximetry 











Oxygen Devices in Use Now: None


Appearance: NAD, laying in bed


Ears/Nose/Mouth/Throat: Mucous Membranes Moist


Respiratory: Symmetrical Chest Expansion and Respiratory Effort, Clear to 

Auscultation


Cardiovascular: NL Sounds; No Murmurs; No JVD, RRR


Abdominal: NL Sounds; No Tenderness; No Distention


Extremities: - - Mild right LE edema


Skin: - - Dressing to right knee clean, dry, and intact


Neurological: Alert and Oriented x 3, NL Muscle Strength and Tone


Lines/Tubes/Other Access: Clean, Dry and Intact Peripheral IV - site benign


Nutrition: Taking PO's


Result Diagrams: 


 03/19/18 04:48





 03/16/18 06:03


Microbiology and Other Data: 


 Microbiology











 03/15/18 10:32 Anaerobic Culture - Preliminary





 Wound - Knee Right    No Growth Day 1





 Gram Stain - Final





 Wound Culture - Preliminary





    No Growth Day 1














Assess/Plan/Problems-Billing


Assessment: 





Ms. Fairchild is a 56 yo female with PMH significant for HTN, anxiety, cervical 

dystonia, chronic pain syndrome, ankylosing spondylitis, HLD, asthma, and 

osteoarthritis who presented to the hospital for an elective right total knee 

arthroplasy with Dr. Jeter.








- Patient Problems


(1) Status post total right knee replacement


Code(s): Z96.651 - PRESENCE OF RIGHT ARTIFICIAL KNEE JOINT   SNOMED Code(s): 

6765965014239


   Comment: 


- POD #4, management per ortho


- HH down from baseline, but stable, continue to trend


- Limit narcotics, will start Tylenol ATC


- Continue OT, PT, pain management, and bowel regimen


   





(2) Orthostatic dizziness


Code(s): R42 - DIZZINESS AND GIDDINESS   SNOMED Code(s): 255684175


   Comment: 


- Pt with significant orthostatic hypotension


- Suspect she is dehydrated


- Received fluid boluses, hold propanolol 


- Orthostatic hypotension improving   





(3) Anemia


Code(s): D64.9 - ANEMIA, UNSPECIFIED   SNOMED Code(s): 536750325


   Comment: 


- Acute blood loss anemia secondary to surgery


- HH stable, continue to trend HH   





(4) Dysphagia


Code(s): R13.10 - DYSPHAGIA, UNSPECIFIED   SNOMED Code(s): 11740852


   Comment: 


- S/P EGD 3/15


- GI consult, input appreciated


- Pt encouraged to take small bites and chew food well


- Biopsy pending   





(5) HTN (hypertension)


Code(s): I10 - ESSENTIAL (PRIMARY) HYPERTENSION   SNOMED Code(s): 18412824


   Comment: 


- SBP 's


- Hold propanolol   





(6) History of asthma


Code(s): Z87.09 - PERSONAL HISTORY OF OTHER DISEASES OF THE RESPIRATORY SYSTEM 

  SNOMED Code(s): 635463578


   Comment: 


- No signs of acute exacerbation


- Continue singulair, dulera, allegra, and albuterol MDI PRN   





(7) Anxiety


Code(s): F41.9 - ANXIETY DISORDER, UNSPECIFIED   SNOMED Code(s): 89872526


   Comment: 


- Continue clonazepam, venlafaxine, and BuSpar   





(8) Cervical dystonia


Code(s): G24.3 - SPASMODIC TORTICOLLIS   SNOMED Code(s): 345423078


   Comment: 


- Continue clonazepam   





(9) DVT prophylaxis


Code(s): RYI0141 -    SNOMED Code(s): 816739771


   Comment: 


- Warfarin   





(10) Full code status


Code(s): Z78.9 - OTHER SPECIFIED HEALTH STATUS   SNOMED Code(s): 041221208


   


Status and Disposition: 





Inpatient. Disposition per Ortho. Thank you for this consultation.

## 2018-03-20 NOTE — DS
DISCHARGE SUMMARY:

 

DATE OF ADMISSION:  03/15/18

 

DATE OF DISCHARGE:  03/19/18



ATTENDING PROVIDER:  Laure Jeter MD * (DICTATED BY ANDRE HSU)

 

PROCEDURE:  Right total knee arthroplasty.

 

CHIEF COMPLAINT:

1.  Right knee pain.

2.  Hypertension.

3.  Anxiety.

4.  Cervical dystonia on the right side.

5.  Chronic pain syndrome.

6.  Ankylosing spondylitis.

7.  Hyperlipidemia.

8.  Asthma.

 

DISCHARGE DIAGNOSES:

1.  Status post right total knee arthroplasty, uncomplicated.

2.  Hypertension.

3.  Anxiety.

4.  Cervical dystonia.

5.  Chronic pain syndrome.

6.  Ankylosing spondylitis.

7.  Hyperlipidemia.

8.  Asthma.

9.  Foreign body sensation in the esophagus.

 

CONSULTATIONS:

1.  Physical Therapy.

2.  Occupational Therapy.

3.  Medicine.

4.  GI.

 

BRIEF HISTORY:  Ms. Fairchild is a very pleasant 57-year-old female with severe end- 
stage degenerative arthritis of the right knee who failed conservative 
treatment and elected to undergo a right total knee arthroplasty on 03/15/18 by 
Dr. Jeter.

 

HOSPITAL COURSE:  The patient was admitted to [facility] on 03/15/18 
where she underwent a right total knee arthroplasty which was uncomplicated. 
Postoperatively she recovered on the surgical short-stay unit.  On 
postoperative day #1, the patient had a sensation of foreign body and was seen 
by GI, she had no evidence on upper endoscopy which did not show a foreign body 
and the patient was told to chew her food well.  Her Irvin was removed on 
postoperative day 2 and she was voiding on her own without difficulty.  The 
patient also suffered from nausea as well as orthostatic hypotension which was 
treated with IV boluses as well as holding her hypertension medications.  She 
advanced to a regular diet.  The pain was controlled with p.o. oxycodone.  She 
was restarted on her home medications other than her propranolol ______ blood 
pressure.  Her labs and vital signs remained stable on the date of discharge.  
She was able to bear weight and tolerated physical therapy in the right lower 
extremity.  Her DVT prophylaxis was managed with Lovenox and Coumadin until she 
reached a therapeutic INR.  By postoperative day 4, she was orthopedically and 
medically stable for discharge to home with home services.

 

PHYSICAL EXAMINATION:  General:  Well appearing, in no acute distress, alert 
and oriented, appeared her stated age, resting in bed comfortably.  
Musculoskeletal: Right lower extremity dressing was removed.  The incision was 
clean, dry and intact.  No swelling, erythema.  Sutures in place.  Area was re-
dressed without difficulties.  Sensation was intact to light touch.  Posterior 
tibial pulses were 2+, positive dorsiflexion and plantar flexion ______.  Vital 
Signs:  Temperature 98.9, pulse 101, respirations 18, blood pressure 109/83, 
pulse oxygenation 92% on room air.

 

LABORATORY DATA:  On the date of discharge includes H and H of 9.4 and 28 with 
INR of 2.10.

 

DISCHARGE MEDICATIONS:

1.  Acetaminophen 325 mg p.o. q.6 hours p.r.n. for pain.

2.  Albuterol 1 to 2 puffs inhale b.i.d. p.r.n.

3.  Ramila-C 1000 mg 1 tablet p.o. daily.

4.  Aspirin 81 mg p.o. daily.

5.  Azelastine/fluticasone 1 spray both nares b.i.d.

6.  Botox p.r.n.

7.  Calcium, mag, zinc with vitamin D3 tablet 1 tablet p.o. q.a.m.

8.  Zyrtec 10 mg p.o. q.a.m.

9.  Clonazepam 0.5 mg to 1 mg p.o. p.r.n.

10.  Flexeril 5 mg p.o. t.i.d.

11.  Colace 100 mg p.o. b.i.d.

12.  Epinephrine 1 injection p.r.n. allergic reaction.

13.  Lidocaine 100 mg p.o.

14.  Dulera 1 puff inhalation b.i.d.

15.  Singulair 10 mg p.o. q.p.m.

16.  Oxybutynin 5 mg p.o. q.h.s.

17.  Oxycodone 5 to 10 mg p.o. q.4 to 6 hours for postoperative pain.

18.  Pazeo eye drops 0.7% 1 drop both eyes q.a.m.

19.  Scopolamine 1.5 mg patch topically every 72 hours. 20  Trazodone 50 mg 
p.o. q.h.s.

21.  Effexor 150 mg p.o. b.i.d.

22.  B-complex 1 capsule p.o. daily.

23.  Coumadin 10 mg p.o. 1 to 3 tablets daily per physician's instructions at 5 
p.m.

 

CONDITION ON DISCHARGE:  Stable.

 

DISCHARGE INSTRUCTIONS:  Mrs. Fairchild is a very pleasant 57-year-old female status 
post right total knee arthroplasty which was uncomplicated.  She is 
orthopedically and medically stable to return home at the same place, she is to 
hold her blood pressure medication and be seen by Dr. Sepulveda within the next 2 
to 3 days.  Have blood pressure rechecked to resume her medication.  She is 
allowed to shower; however, no bathing or submerging her wound.  She was 
advised not to rise too fast from a lying or sitting position due to her 
history of orthostatic hypotension while inhouse.  She was set up for home care 
for the visiting nurses to do wound check as well as check her blood level for 
INR on Mondays and Thursdays.  Her Coumadin home dosing is 2 mg on 03/19/18 and 
4 mg on 03/20/18 and 2 mg on 03/21/18 with INR draw on 03/22/18.  She will take 
oxycodone 5 to 10 mg as needed every 4 to 6 hours for pain as well as Colace to 
prevent constipation.  She will contact the office immediately if she has any 
fever, chills, redness, erythema or drainage around the wound site or go to the 
ER immediately if any chest pain or shortness of breath.

 

____________________________________ ANDRE HSU

 

[id number]/CPS #: [id number]

DANE